# Patient Record
Sex: FEMALE | Race: WHITE | NOT HISPANIC OR LATINO | Employment: OTHER | URBAN - METROPOLITAN AREA
[De-identification: names, ages, dates, MRNs, and addresses within clinical notes are randomized per-mention and may not be internally consistent; named-entity substitution may affect disease eponyms.]

---

## 2023-04-17 DIAGNOSIS — F41.8 DEPRESSION WITH ANXIETY: Primary | ICD-10-CM

## 2023-04-17 RX ORDER — SERTRALINE HYDROCHLORIDE 50 MG/1
50 TABLET, FILM COATED ORAL DAILY
Qty: 90 TABLET | Refills: 0 | Status: SHIPPED | OUTPATIENT
Start: 2023-04-17 | End: 2023-07-20 | Stop reason: SDUPTHER

## 2023-04-17 RX ORDER — SERTRALINE HYDROCHLORIDE 50 MG/1
1 TABLET, FILM COATED ORAL DAILY
COMMUNITY
Start: 2013-10-25 | End: 2023-04-17 | Stop reason: SDUPTHER

## 2023-07-17 DIAGNOSIS — F41.8 DEPRESSION WITH ANXIETY: ICD-10-CM

## 2023-07-17 RX ORDER — SERTRALINE HYDROCHLORIDE 50 MG/1
TABLET, FILM COATED ORAL
Qty: 90 TABLET | Refills: 3 | OUTPATIENT
Start: 2023-07-17

## 2023-07-20 DIAGNOSIS — F41.8 DEPRESSION WITH ANXIETY: ICD-10-CM

## 2023-07-20 RX ORDER — SERTRALINE HYDROCHLORIDE 50 MG/1
50 TABLET, FILM COATED ORAL DAILY
Qty: 90 TABLET | Refills: 0 | Status: SHIPPED | OUTPATIENT
Start: 2023-07-20 | End: 2023-10-17 | Stop reason: SDUPTHER

## 2023-08-04 ENCOUNTER — APPOINTMENT (OUTPATIENT)
Dept: PRIMARY CARE | Facility: CLINIC | Age: 83
End: 2023-08-04
Payer: MEDICARE

## 2023-08-08 ENCOUNTER — OFFICE VISIT (OUTPATIENT)
Dept: PRIMARY CARE | Facility: CLINIC | Age: 83
End: 2023-08-08
Payer: MEDICARE

## 2023-08-08 VITALS
HEART RATE: 65 BPM | BODY MASS INDEX: 25.69 KG/M2 | DIASTOLIC BLOOD PRESSURE: 84 MMHG | HEIGHT: 63 IN | SYSTOLIC BLOOD PRESSURE: 132 MMHG | WEIGHT: 145 LBS

## 2023-08-08 DIAGNOSIS — Z51.81 ENCOUNTER FOR THERAPEUTIC DRUG LEVEL MONITORING: ICD-10-CM

## 2023-08-08 DIAGNOSIS — I48.20 CHRONIC ATRIAL FIBRILLATION (MULTI): ICD-10-CM

## 2023-08-08 DIAGNOSIS — F51.01 PRIMARY INSOMNIA: ICD-10-CM

## 2023-08-08 DIAGNOSIS — I10 PRIMARY HYPERTENSION: Primary | ICD-10-CM

## 2023-08-08 DIAGNOSIS — E78.2 MIXED HYPERLIPIDEMIA: ICD-10-CM

## 2023-08-08 DIAGNOSIS — D05.11 DUCTAL CARCINOMA IN SITU (DCIS) OF RIGHT BREAST: ICD-10-CM

## 2023-08-08 PROBLEM — G47.00 INSOMNIA: Status: ACTIVE | Noted: 2023-08-08

## 2023-08-08 PROBLEM — I50.810 RIGHT HEART FAILURE (MULTI): Status: ACTIVE | Noted: 2023-08-08

## 2023-08-08 PROBLEM — G56.21 CUBITAL TUNNEL SYNDROME, RIGHT: Status: ACTIVE | Noted: 2023-08-08

## 2023-08-08 PROBLEM — R73.9 HYPERGLYCEMIA: Status: ACTIVE | Noted: 2023-08-08

## 2023-08-08 PROBLEM — G57.50 TARSAL TUNNEL SYNDROME: Status: ACTIVE | Noted: 2023-08-08

## 2023-08-08 PROBLEM — I07.1 SEVERE TRICUSPID REGURGITATION: Status: ACTIVE | Noted: 2023-08-08

## 2023-08-08 PROBLEM — N64.89 SEROMA OF BREAST: Status: ACTIVE | Noted: 2023-08-08

## 2023-08-08 PROBLEM — R92.8 ABNORMAL MAMMOGRAM: Status: ACTIVE | Noted: 2023-08-08

## 2023-08-08 PROBLEM — G25.0 BENIGN FAMILIAL TREMOR: Status: ACTIVE | Noted: 2023-08-08

## 2023-08-08 PROBLEM — R92.1 BREAST CALCIFICATION, RIGHT: Status: ACTIVE | Noted: 2023-08-08

## 2023-08-08 PROBLEM — S46.911A RIGHT SHOULDER STRAIN: Status: ACTIVE | Noted: 2023-08-08

## 2023-08-08 PROBLEM — N64.59 BLEEDING FROM BREAST: Status: ACTIVE | Noted: 2023-08-08

## 2023-08-08 PROBLEM — R00.1 SINUS BRADYCARDIA: Status: ACTIVE | Noted: 2023-08-08

## 2023-08-08 PROBLEM — Z98.890 S/P LUMPECTOMY, RIGHT BREAST: Status: ACTIVE | Noted: 2023-08-08

## 2023-08-08 PROBLEM — I42.5 RESTRICTIVE CARDIOMYOPATHY (MULTI): Status: ACTIVE | Noted: 2023-08-08

## 2023-08-08 PROBLEM — M79.641 HAND PAIN, RIGHT: Status: ACTIVE | Noted: 2023-08-08

## 2023-08-08 PROBLEM — E78.5 HYPERLIPIDEMIA: Status: ACTIVE | Noted: 2023-08-08

## 2023-08-08 PROBLEM — E55.9 VITAMIN D DEFICIENCY: Status: ACTIVE | Noted: 2023-08-08

## 2023-08-08 PROBLEM — I48.91 ATRIAL FIBRILLATION (MULTI): Status: ACTIVE | Noted: 2023-08-08

## 2023-08-08 PROBLEM — H35.30 MACULAR DEGENERATION: Status: ACTIVE | Noted: 2023-08-08

## 2023-08-08 PROBLEM — Z95.4 TRICUSPID VALVE REPLACED: Status: ACTIVE | Noted: 2023-08-08

## 2023-08-08 LAB
ALANINE AMINOTRANSFERASE (SGPT) (U/L) IN SER/PLAS: 18 U/L (ref 7–45)
ALBUMIN (G/DL) IN SER/PLAS: 4.7 G/DL (ref 3.4–5)
ALKALINE PHOSPHATASE (U/L) IN SER/PLAS: 59 U/L (ref 33–136)
ANION GAP IN SER/PLAS: 15 MMOL/L (ref 10–20)
ASPARTATE AMINOTRANSFERASE (SGOT) (U/L) IN SER/PLAS: 22 U/L (ref 9–39)
BASOPHILS (10*3/UL) IN BLOOD BY AUTOMATED COUNT: 0.05 X10E9/L (ref 0–0.1)
BASOPHILS/100 LEUKOCYTES IN BLOOD BY AUTOMATED COUNT: 1.2 % (ref 0–2)
BILIRUBIN TOTAL (MG/DL) IN SER/PLAS: 0.7 MG/DL (ref 0–1.2)
CALCIUM (MG/DL) IN SER/PLAS: 9.7 MG/DL (ref 8.6–10.6)
CARBON DIOXIDE, TOTAL (MMOL/L) IN SER/PLAS: 30 MMOL/L (ref 21–32)
CHLORIDE (MMOL/L) IN SER/PLAS: 100 MMOL/L (ref 98–107)
CHOLESTEROL (MG/DL) IN SER/PLAS: 189 MG/DL (ref 0–199)
CHOLESTEROL IN HDL (MG/DL) IN SER/PLAS: 96.9 MG/DL
CHOLESTEROL/HDL RATIO: 2
CREATININE (MG/DL) IN SER/PLAS: 0.99 MG/DL (ref 0.5–1.05)
EOSINOPHILS (10*3/UL) IN BLOOD BY AUTOMATED COUNT: 0.08 X10E9/L (ref 0–0.4)
EOSINOPHILS/100 LEUKOCYTES IN BLOOD BY AUTOMATED COUNT: 1.9 % (ref 0–6)
ERYTHROCYTE DISTRIBUTION WIDTH (RATIO) BY AUTOMATED COUNT: 13.5 % (ref 11.5–14.5)
ERYTHROCYTE MEAN CORPUSCULAR HEMOGLOBIN CONCENTRATION (G/DL) BY AUTOMATED: 31.4 G/DL (ref 32–36)
ERYTHROCYTE MEAN CORPUSCULAR VOLUME (FL) BY AUTOMATED COUNT: 96 FL (ref 80–100)
ERYTHROCYTES (10*6/UL) IN BLOOD BY AUTOMATED COUNT: 4.23 X10E12/L (ref 4–5.2)
GFR FEMALE: 57 ML/MIN/1.73M2
GLUCOSE (MG/DL) IN SER/PLAS: 90 MG/DL (ref 74–99)
HEMATOCRIT (%) IN BLOOD BY AUTOMATED COUNT: 40.4 % (ref 36–46)
HEMOGLOBIN (G/DL) IN BLOOD: 12.7 G/DL (ref 12–16)
IMMATURE GRANULOCYTES/100 LEUKOCYTES IN BLOOD BY AUTOMATED COUNT: 0.2 % (ref 0–0.9)
LDL: 77 MG/DL (ref 0–99)
LEUKOCYTES (10*3/UL) IN BLOOD BY AUTOMATED COUNT: 4.2 X10E9/L (ref 4.4–11.3)
LYMPHOCYTES (10*3/UL) IN BLOOD BY AUTOMATED COUNT: 1.43 X10E9/L (ref 0.8–3)
LYMPHOCYTES/100 LEUKOCYTES IN BLOOD BY AUTOMATED COUNT: 33.9 % (ref 13–44)
MONOCYTES (10*3/UL) IN BLOOD BY AUTOMATED COUNT: 0.41 X10E9/L (ref 0.05–0.8)
MONOCYTES/100 LEUKOCYTES IN BLOOD BY AUTOMATED COUNT: 9.7 % (ref 2–10)
NEUTROPHILS (10*3/UL) IN BLOOD BY AUTOMATED COUNT: 2.24 X10E9/L (ref 1.6–5.5)
NEUTROPHILS/100 LEUKOCYTES IN BLOOD BY AUTOMATED COUNT: 53.1 % (ref 40–80)
NRBC (PER 100 WBCS) BY AUTOMATED COUNT: 0 /100 WBC (ref 0–0)
PLATELETS (10*3/UL) IN BLOOD AUTOMATED COUNT: 211 X10E9/L (ref 150–450)
POTASSIUM (MMOL/L) IN SER/PLAS: 4.6 MMOL/L (ref 3.5–5.3)
PROTEIN TOTAL: 7.5 G/DL (ref 6.4–8.2)
SODIUM (MMOL/L) IN SER/PLAS: 140 MMOL/L (ref 136–145)
TRIGLYCERIDE (MG/DL) IN SER/PLAS: 75 MG/DL (ref 0–149)
UREA NITROGEN (MG/DL) IN SER/PLAS: 14 MG/DL (ref 6–23)
VLDL: 15 MG/DL (ref 0–40)

## 2023-08-08 PROCEDURE — 80053 COMPREHEN METABOLIC PANEL: CPT

## 2023-08-08 PROCEDURE — 1159F MED LIST DOCD IN RCRD: CPT | Performed by: FAMILY MEDICINE

## 2023-08-08 PROCEDURE — G0439 PPPS, SUBSEQ VISIT: HCPCS | Performed by: FAMILY MEDICINE

## 2023-08-08 PROCEDURE — 99214 OFFICE O/P EST MOD 30 MIN: CPT | Performed by: FAMILY MEDICINE

## 2023-08-08 PROCEDURE — 85025 COMPLETE CBC W/AUTO DIFF WBC: CPT

## 2023-08-08 PROCEDURE — 1126F AMNT PAIN NOTED NONE PRSNT: CPT | Performed by: FAMILY MEDICINE

## 2023-08-08 PROCEDURE — 1036F TOBACCO NON-USER: CPT | Performed by: FAMILY MEDICINE

## 2023-08-08 PROCEDURE — 80061 LIPID PANEL: CPT

## 2023-08-08 PROCEDURE — 1170F FXNL STATUS ASSESSED: CPT | Performed by: FAMILY MEDICINE

## 2023-08-08 PROCEDURE — 80307 DRUG TEST PRSMV CHEM ANLYZR: CPT

## 2023-08-08 PROCEDURE — 1160F RVW MEDS BY RX/DR IN RCRD: CPT | Performed by: FAMILY MEDICINE

## 2023-08-08 PROCEDURE — 3075F SYST BP GE 130 - 139MM HG: CPT | Performed by: FAMILY MEDICINE

## 2023-08-08 PROCEDURE — 3079F DIAST BP 80-89 MM HG: CPT | Performed by: FAMILY MEDICINE

## 2023-08-08 RX ORDER — AMLODIPINE BESYLATE 5 MG/1
TABLET ORAL
COMMUNITY
End: 2023-11-10

## 2023-08-08 RX ORDER — ATORVASTATIN CALCIUM 20 MG/1
TABLET, FILM COATED ORAL
COMMUNITY
End: 2024-05-09 | Stop reason: SDUPTHER

## 2023-08-08 RX ORDER — ACETAMINOPHEN 500 MG
TABLET ORAL
COMMUNITY

## 2023-08-08 RX ORDER — MULTIVITAMIN
TABLET ORAL
COMMUNITY
End: 2024-05-14 | Stop reason: ALTCHOICE

## 2023-08-08 RX ORDER — POTASSIUM CHLORIDE 750 MG/1
TABLET, FILM COATED, EXTENDED RELEASE ORAL
COMMUNITY

## 2023-08-08 RX ORDER — ZOLPIDEM TARTRATE 10 MG/1
TABLET ORAL
COMMUNITY
End: 2024-05-14 | Stop reason: ALTCHOICE

## 2023-08-08 RX ORDER — FUROSEMIDE 20 MG/1
TABLET ORAL
COMMUNITY

## 2023-08-08 ASSESSMENT — ENCOUNTER SYMPTOMS
MUSCULOSKELETAL NEGATIVE: 1
CONSTITUTIONAL NEGATIVE: 1
CARDIOVASCULAR NEGATIVE: 1
RESPIRATORY NEGATIVE: 1
LOSS OF SENSATION IN FEET: 0
OCCASIONAL FEELINGS OF UNSTEADINESS: 0
DEPRESSION: 0
GASTROINTESTINAL NEGATIVE: 1
TREMORS: 1

## 2023-08-08 ASSESSMENT — ACTIVITIES OF DAILY LIVING (ADL)
BATHING: INDEPENDENT
DOING_HOUSEWORK: INDEPENDENT
MANAGING_FINANCES: INDEPENDENT
DRESSING: INDEPENDENT
TAKING_MEDICATION: INDEPENDENT
GROCERY_SHOPPING: INDEPENDENT

## 2023-08-08 ASSESSMENT — PATIENT HEALTH QUESTIONNAIRE - PHQ9
2. FEELING DOWN, DEPRESSED OR HOPELESS: NOT AT ALL
SUM OF ALL RESPONSES TO PHQ9 QUESTIONS 1 AND 2: 0
1. LITTLE INTEREST OR PLEASURE IN DOING THINGS: NOT AT ALL

## 2023-08-08 NOTE — PROGRESS NOTES
Subjective   Patient ID: Sirisha Whyte is a 82 y.o. female who presents for Medicare Annual Wellness Visit Subsequent.    HPI htn, chol, depression well cont , insomnia well cont on zolpidem, breaset ca hx stable , afibb rate contOARRS:  No data recorded  I have personally reviewed the OARRS report for Sirisha Whyte. I have considered the risks of abuse, dependence, addiction and diversion    Is the patient prescribed a combination of a benzodiazepine and opioid?  No    Last Urine Drug Screen / ordered today: Yes  Recent Results (from the past 96017 hour(s))   Drug Screen, Urine With Reflex to Confirmation    Collection Time: 04/21/22 11:21 AM   Result Value Ref Range    DRUG SCREEN COMMENT URINE SEE BELOW     Amphetamine Screen, Urine PRESUMPTIVE NEGATIVE NEGATIVE    Barbiturate Screen, Urine PRESUMPTIVE NEGATIVE NEGATIVE    BENZODIAZEPINE (PRESENCE) IN URINE BY SCREEN METHOD PRESUMPTIVE NEGATIVE NEGATIVE    Cannabinoid Screen, Urine PRESUMPTIVE NEGATIVE NEGATIVE    Cocaine Screen, Urine PRESUMPTIVE NEGATIVE NEGATIVE    Fentanyl, Ur PRESUMPTIVE NEGATIVE NEGATIVE    Methadone Screen, Urine PRESUMPTIVE NEGATIVE NEGATIVE    Opiate Screen, Urine PRESUMPTIVE NEGATIVE NEGATIVE    Oxycodone Screen, Ur PRESUMPTIVE NEGATIVE NEGATIVE    PCP Screen, Urine PRESUMPTIVE NEGATIVE NEGATIVE     N/A    Controlled Substance Agreement:  Date of the Last Agreement: today  Reviewed Controlled Substance Agreement including but not limited to the benefits, risks, and alternatives to treatment with a Controlled Substance medication(s).    Sleep Aids:   What is the patient's goal of therapy? Sleep aid  Is this being achieved with current treatment? yes    Activities of Daily Living:   Is your overall impression that this patient is benefiting (symptom reduction outweighs side effects) from sleep aid therapy? Yes     1. Physical Functioning: Better  2. Family Relationship: Better  3. Social Relationship: Better  4. Mood: Better  5.  "Sleep Patterns: Better  6. Overall Function: Better      Review of Systems   Constitutional: Negative.    HENT: Negative.     Respiratory: Negative.     Cardiovascular: Negative.    Gastrointestinal: Negative.    Musculoskeletal: Negative.         Carpal tunnel flare bilat    Neurological:  Positive for tremors.       Objective   /84   Pulse 65   Ht 1.6 m (5' 3\")   Wt 65.8 kg (145 lb)   BMI 25.69 kg/m²     Physical Exam  Vitals reviewed.   Constitutional:       Appearance: Normal appearance. She is normal weight.   Eyes:      Extraocular Movements: Extraocular movements intact.      Conjunctiva/sclera: Conjunctivae normal.      Pupils: Pupils are equal, round, and reactive to light.   Cardiovascular:      Rate and Rhythm: Normal rate and regular rhythm.      Pulses: Normal pulses.      Heart sounds: Normal heart sounds.   Pulmonary:      Effort: Pulmonary effort is normal.      Breath sounds: Normal breath sounds.   Abdominal:      General: Bowel sounds are normal.      Palpations: Abdomen is soft.   Musculoskeletal:         General: Normal range of motion.   Skin:     General: Skin is warm and dry.   Neurological:      General: No focal deficit present.      Mental Status: She is alert and oriented to person, place, and time. Mental status is at baseline.      Comments: Left sided intention tremor , no cogwheel rigidity         Assessment/Plan   Problem List Items Addressed This Visit       Atrial fibrillation (CMS/HCC)    Relevant Medications    amLODIPine (Norvasc) 5 mg tablet    Other Relevant Orders    CBC and Auto Differential    Ductal carcinoma in situ (DCIS) of right breast    Hyperlipidemia    Relevant Orders    Lipid Panel    Hypertension - Primary    Relevant Orders    Comprehensive Metabolic Panel    Insomnia    Relevant Orders    Drug Screen, Urine With Reflex to Confirmation     Other Visit Diagnoses       Encounter for therapeutic drug level monitoring        Relevant Orders    Drug Screen, " Urine With Reflex to Confirmation        Bilat carpal tunnel - wrist splints at bedtime for 2 wk

## 2023-08-08 NOTE — PROGRESS NOTES
Subjective   Reason for Visit: Sirisha Whyte is an 82 y.o. female here for a Medicare Wellness visit.      Pt comes in for wellness exam. Pt states she gets some tingling in hands and fingers sometimes.          HPI    Patient Care Team:  Chris Henderson MD as PCP - General (Family Medicine)     Review of Systems    Objective   Vitals:  There were no vitals taken for this visit.      Physical Exam    Assessment/Plan   Problem List Items Addressed This Visit    None

## 2023-08-09 LAB
AMPHETAMINE (PRESENCE) IN URINE BY SCREEN METHOD: NORMAL
BARBITURATES PRESENCE IN URINE BY SCREEN METHOD: NORMAL
BENZODIAZEPINE (PRESENCE) IN URINE BY SCREEN METHOD: NORMAL
CANNABINOIDS IN URINE BY SCREEN METHOD: NORMAL
COCAINE (PRESENCE) IN URINE BY SCREEN METHOD: NORMAL
DRUG SCREEN COMMENT URINE: NORMAL
FENTANYL URINE: NORMAL
METHADONE (PRESENCE) IN URINE BY SCREEN METHOD: NORMAL
OPIATES (PRESENCE) IN URINE BY SCREEN METHOD: NORMAL
OXYCODONE (PRESENCE) IN URINE BY SCREEN METHOD: NORMAL
PHENCYCLIDINE (PRESENCE) IN URINE BY SCREEN METHOD: NORMAL

## 2023-08-10 ENCOUNTER — TELEPHONE (OUTPATIENT)
Dept: PRIMARY CARE | Facility: CLINIC | Age: 83
End: 2023-08-10
Payer: MEDICARE

## 2023-08-24 ENCOUNTER — TELEPHONE (OUTPATIENT)
Dept: PRIMARY CARE | Facility: CLINIC | Age: 83
End: 2023-08-24
Payer: MEDICARE

## 2023-09-22 PROBLEM — J30.9 ALLERGIC RHINITIS: Status: ACTIVE | Noted: 2023-09-22

## 2023-09-22 PROBLEM — I36.1 NON-RHEUMATIC TRICUSPID VALVE INSUFFICIENCY: Status: ACTIVE | Noted: 2019-07-31

## 2023-09-22 PROBLEM — K59.09 CHRONIC CONSTIPATION: Status: ACTIVE | Noted: 2023-09-22

## 2023-09-22 PROBLEM — Z92.3 S/P RADIATION THERAPY: Status: ACTIVE | Noted: 2023-09-22

## 2023-09-22 PROBLEM — L50.0 ALLERGIC URTICARIA: Status: ACTIVE | Noted: 2023-09-22

## 2023-09-22 PROBLEM — I51.89: Status: ACTIVE | Noted: 2019-08-07

## 2023-09-22 PROBLEM — I25.810 CORONARY ARTERY DISEASE INVOLVING AUTOLOGOUS ARTERY CORONARY BYPASS GRAFT WITHOUT ANGINA PECTORIS: Status: ACTIVE | Noted: 2019-07-31

## 2023-09-22 RX ORDER — ACETAMINOPHEN 325 MG/1
650 TABLET ORAL EVERY 4 HOURS PRN
COMMUNITY
Start: 2019-08-11

## 2023-09-22 RX ORDER — FUROSEMIDE 20 MG/1
20 TABLET ORAL
COMMUNITY
Start: 2019-08-12 | End: 2024-05-14 | Stop reason: ALTCHOICE

## 2023-09-22 RX ORDER — WARFARIN 4 MG/1
4 TABLET ORAL
COMMUNITY
Start: 2019-08-11 | End: 2024-05-14 | Stop reason: ALTCHOICE

## 2023-09-22 RX ORDER — SPIRONOLACTONE 25 MG/1
25 TABLET ORAL
COMMUNITY
Start: 2019-08-12 | End: 2024-05-14 | Stop reason: ALTCHOICE

## 2023-09-22 RX ORDER — METOPROLOL SUCCINATE 50 MG/1
50 TABLET, EXTENDED RELEASE ORAL 2 TIMES DAILY
COMMUNITY
Start: 2019-08-11 | End: 2024-05-14 | Stop reason: ALTCHOICE

## 2023-09-22 RX ORDER — POLYETHYLENE GLYCOL 3350 17 G/17G
17 POWDER, FOR SOLUTION ORAL EVERY 12 HOURS PRN
COMMUNITY
Start: 2019-08-11

## 2023-09-22 RX ORDER — AMLODIPINE BESYLATE 2.5 MG/1
2.5 TABLET ORAL
COMMUNITY
Start: 2017-09-27 | End: 2024-05-14 | Stop reason: ALTCHOICE

## 2023-09-22 RX ORDER — NAPROXEN SODIUM 220 MG/1
81 TABLET, FILM COATED ORAL
COMMUNITY
Start: 2019-08-12 | End: 2024-05-14 | Stop reason: ALTCHOICE

## 2023-09-22 RX ORDER — ZOLPIDEM TARTRATE 5 MG/1
5 TABLET ORAL NIGHTLY
COMMUNITY
Start: 2019-08-11

## 2023-09-22 RX ORDER — VIT C/E/ZN/COPPR/LUTEIN/ZEAXAN 250MG-90MG
1000 CAPSULE ORAL
COMMUNITY
Start: 2011-10-20 | End: 2024-05-14 | Stop reason: ALTCHOICE

## 2023-10-16 DIAGNOSIS — F41.8 DEPRESSION WITH ANXIETY: ICD-10-CM

## 2023-10-16 RX ORDER — SERTRALINE HYDROCHLORIDE 50 MG/1
50 TABLET, FILM COATED ORAL DAILY
Qty: 90 TABLET | Refills: 3 | OUTPATIENT
Start: 2023-10-16

## 2023-10-18 RX ORDER — SERTRALINE HYDROCHLORIDE 50 MG/1
50 TABLET, FILM COATED ORAL DAILY
Qty: 90 TABLET | Refills: 0 | Status: SHIPPED | OUTPATIENT
Start: 2023-10-18 | End: 2024-01-16 | Stop reason: SDUPTHER

## 2023-10-23 ENCOUNTER — OFFICE VISIT (OUTPATIENT)
Dept: CARDIOLOGY | Facility: CLINIC | Age: 83
End: 2023-10-23
Payer: MEDICARE

## 2023-10-23 VITALS
SYSTOLIC BLOOD PRESSURE: 133 MMHG | DIASTOLIC BLOOD PRESSURE: 84 MMHG | HEIGHT: 62 IN | BODY MASS INDEX: 27.51 KG/M2 | HEART RATE: 79 BPM | WEIGHT: 149.5 LBS | OXYGEN SATURATION: 97 %

## 2023-10-23 DIAGNOSIS — I10 PRIMARY HYPERTENSION: Primary | ICD-10-CM

## 2023-10-23 PROCEDURE — 1160F RVW MEDS BY RX/DR IN RCRD: CPT | Performed by: INTERNAL MEDICINE

## 2023-10-23 PROCEDURE — 93010 ELECTROCARDIOGRAM REPORT: CPT | Performed by: INTERNAL MEDICINE

## 2023-10-23 PROCEDURE — 99214 OFFICE O/P EST MOD 30 MIN: CPT | Mod: PO,25 | Performed by: INTERNAL MEDICINE

## 2023-10-23 PROCEDURE — 3079F DIAST BP 80-89 MM HG: CPT | Performed by: INTERNAL MEDICINE

## 2023-10-23 PROCEDURE — 99214 OFFICE O/P EST MOD 30 MIN: CPT | Performed by: INTERNAL MEDICINE

## 2023-10-23 PROCEDURE — 3075F SYST BP GE 130 - 139MM HG: CPT | Performed by: INTERNAL MEDICINE

## 2023-10-23 PROCEDURE — 93005 ELECTROCARDIOGRAM TRACING: CPT | Mod: PO | Performed by: INTERNAL MEDICINE

## 2023-10-23 PROCEDURE — 1126F AMNT PAIN NOTED NONE PRSNT: CPT | Performed by: INTERNAL MEDICINE

## 2023-10-23 PROCEDURE — 1036F TOBACCO NON-USER: CPT | Performed by: INTERNAL MEDICINE

## 2023-10-23 PROCEDURE — 1159F MED LIST DOCD IN RCRD: CPT | Performed by: INTERNAL MEDICINE

## 2023-10-23 ASSESSMENT — PATIENT HEALTH QUESTIONNAIRE - PHQ9
SUM OF ALL RESPONSES TO PHQ9 QUESTIONS 1 AND 2: 0
1. LITTLE INTEREST OR PLEASURE IN DOING THINGS: NOT AT ALL
2. FEELING DOWN, DEPRESSED OR HOPELESS: NOT AT ALL

## 2023-10-23 ASSESSMENT — COLUMBIA-SUICIDE SEVERITY RATING SCALE - C-SSRS
1. IN THE PAST MONTH, HAVE YOU WISHED YOU WERE DEAD OR WISHED YOU COULD GO TO SLEEP AND NOT WAKE UP?: NO
2. HAVE YOU ACTUALLY HAD ANY THOUGHTS OF KILLING YOURSELF?: NO
6. HAVE YOU EVER DONE ANYTHING, STARTED TO DO ANYTHING, OR PREPARED TO DO ANYTHING TO END YOUR LIFE?: NO

## 2023-10-23 ASSESSMENT — ENCOUNTER SYMPTOMS
OCCASIONAL FEELINGS OF UNSTEADINESS: 0
LOSS OF SENSATION IN FEET: 0
DEPRESSION: 0

## 2023-10-23 ASSESSMENT — PAIN SCALES - GENERAL: PAINLEVEL: 0-NO PAIN

## 2023-10-23 NOTE — PROGRESS NOTES
Primary Care Physician: Chris Henderson MD  Date of Visit: 10/23/2023 10:40 AM EDT  [place] [facility] [place] [address]     Chief Complaint:        HPI / Summary:   Sirisha Whyte is a 83 y.o. female presents for followup. 83-year-old female being seen in cardiovascular office follow-up.  She has a history of tricuspid valve repair and bypass in 2019 at [facility].  She recently returned from travel abroad visited [facility] [place] and seem to travel widely with relatively little limitation.  She notes some fatigue but aside from that no signs of hypervolemia per se.  She had an echo in 2021 showing a minimally regurgitant tricuspid valve.  Denies chest pain orthopnea PND.  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 She has never used smokeless tobacco. She reports current alcohol use of about 3.0 standard drinks of alcohol per week. She reports that she does not use drugs.      Allergies:  Allergies   Allergen Reactions    Ace Inhibitors Angioedema and Swelling       Outpatient Medications:  Current Outpatient Medications   Medication Instructions    acetaminophen (TYLENOL) 650 mg, oral, Every 4 hours PRN    amLODIPine (Norvasc) 5 mg tablet Take 1 tablet daily    amLODIPine (NORVASC) 2.5 mg, oral, Daily RT    apixaban (Eliquis) 5 mg tablet Take 1 tablet twice a day    aspirin 81 mg, oral, Daily RT    atorvastatin (Lipitor) 20 mg tablet Take 1  "tablet daily    cholecalciferol (Vitamin D-3) 50 mcg (2,000 unit) capsule TAKE 1 CAPSULE Daily    cholecalciferol (VITAMIN D-3) 1,000 Units, oral, Daily RT    furosemide (Lasix) 20 mg tablet TAKE 1 TABLET DAILY AS NEEDED FOR WEIGHT GAIN MORE THAN 2 TO 3 POUNDS IN TWO DAYS    furosemide (LASIX) 20 mg, oral, Daily RT    metoprolol succinate XL (TOPROL-XL) 50 mg, oral, 2 times daily    multivit with minerals/lutein (VISION PLUS LUTEIN ORAL) 1 capsule, oral, Daily    multivitamin tablet TAKE 1 TABLET DAILY.    polyethylene glycol (GLYCOLAX, MIRALAX) 17 g, oral, Every 12 hours PRN    potassium chloride CR 10 mEq ER tablet Take 1 tablet daily    sertraline (ZOLOFT) 50 mg, oral, Daily    spironolactone (ALDACTONE) 25 mg, oral, Daily RT    warfarin (COUMADIN) 4 mg, oral, Daily RT    wheat dextrin (BENEFIBER HEALTHY SHAPE ORAL) oral, Benefiber oral powder ---use as directed    zolpidem (Ambien) 10 mg tablet TAKE 1 TABLET Bedtime PRN    zolpidem (AMBIEN) 5 mg, oral, Nightly       ROS     Physical Exam:  Vitals:    10/23/23 1030   BP: 133/84   BP Location: Left arm   Patient Position: Sitting   BP Cuff Size: Adult   Pulse: 79   SpO2: 97%   Weight: 67.8 kg (149 lb 8 oz)   Height: 1.575 m (5' 2\")     Wt Readings from Last 5 Encounters:   10/23/23 67.8 kg (149 lb 8 oz)   08/08/23 65.8 kg (145 lb)   05/08/23 68.6 kg (151 lb 4 oz)   04/24/23 66.5 kg (146 lb 9 oz)   11/21/22 67.6 kg (149 lb)     Body mass index is 27.34 kg/m².   Well-developed well-nourished female in no acute distress.  Flat JVP.  Irregular rhythm.  No gallop no murmur.  Clear lungs.  Soft abdomen.  No lower extremity edema     Last Labs:  CMP:  Recent Labs     08/08/23  1320 11/21/22  1055 04/21/22  1110 05/06/21  1141 10/13/20  1305    137 139 140 138   K 4.6 4.7 4.1 4.2 4.4    102 100 100 100   CO2 30 27 30 31 30   ANIONGAP 15 13 13 13 12   BUN 14 21 15 14 15   CREATININE 0.99 1.02 0.88 0.85 1.08*   GLUCOSE 90 83 92 95 90     Recent Labs     " "08/08/23  1320 11/21/22  1055 04/21/22  1110 10/13/20  1305 01/18/19  0917   ALBUMIN 4.7 4.5 4.6 4.4 4.7   ALKPHOS 59 59 75 66 66   ALT 18 22 21 24 20   AST 22 23 24 29 23   BILITOT 0.7 0.7 0.7 0.7 0.6     CBC:  Recent Labs     08/08/23  1320 11/21/22  1055 04/21/22  1110 05/06/21  1141 10/13/20  1305   WBC 4.2* 3.6* 4.0* 4.6 4.5   HGB 12.7 12.4 13.2 13.1 12.4   HCT 40.4 39.3 40.4 40.7 39.3    185 219 254 211   MCV 96 95 94 95 96     COAG:   Recent Labs     08/22/19  1019 06/26/18  1506   INR 1.5* 1.3*     HEME/ENDO:  Recent Labs     11/21/22  1055 10/13/20  1305   TSH 1.49 1.92      CARDIAC: No results for input(s): \"LDH\", \"CKMB\", \"TROPHS\", \"BNP\" in the last 95789 hours.    No lab exists for component: \"CK\", \"CKMBP\"  Recent Labs     08/08/23  1320 11/21/22  1055 04/21/22  1110   CHOL 189 186 192   LDLF 77 80 76   HDL 96.9 93.1 106.5   TRIG 75 63 50       Last Cardiology Tests:  ECG:      Echo:  Echo Results:  No results found for this or any previous visit from the past 3650 days.       Cath:      Stress Test:  Stress Results:  No results found for this or any previous visit from the past 365 days.         Cardiac Imaging:        Assessment/Plan     83-year-old female being seen in cardiovascular office follow-up.  She has a history of tricuspid valve repair and bypass in 2019 at [facility].  S she does not demonstrate any signs of hypervolemia.  She continues to do well clinically I tried to order some reassurance.  No further diagnostic imaging is advised.  Reviewed signs and symptoms of heart failure.  Follow-up 6 months thank you for allowing me to participate in her care followup Appts:  Future Appointments   Date Time Provider Department Center   4/23/2024 10:00 AM Fuentes Pedraza MD [id number] [facility]   5/13/2024  8:45 AM [address]   5/13/2024  9:30 AM EDER Bright-CNP [id number] [facility]           ____________________________________________________________  Fuentes Pedraza, " MD    Senior Attending Physician  [facility]  [facility]    [facility]  [facility] [employer]

## 2023-10-30 LAB
ATRIAL RATE: 300 BPM
Q ONSET: 218 MS
QRS COUNT: 13 BEATS
QRS DURATION: 80 MS
QT INTERVAL: 396 MS
QTC CALCULATION(BAZETT): 454 MS
QTC FREDERICIA: 434 MS
R AXIS: 44 DEGREES
T AXIS: -31 DEGREES
T OFFSET: 416 MS
VENTRICULAR RATE: 79 BPM

## 2023-11-10 DIAGNOSIS — I10 PRIMARY HYPERTENSION: Primary | ICD-10-CM

## 2023-11-10 RX ORDER — AMLODIPINE BESYLATE 5 MG/1
TABLET ORAL
Qty: 90 TABLET | Refills: 3 | Status: SHIPPED | OUTPATIENT
Start: 2023-11-10

## 2023-11-10 RX ORDER — APIXABAN 5 MG/1
5 TABLET, FILM COATED ORAL 2 TIMES DAILY
Qty: 180 TABLET | Refills: 3 | Status: SHIPPED | OUTPATIENT
Start: 2023-11-10 | End: 2023-11-15 | Stop reason: SDUPTHER

## 2023-11-15 ENCOUNTER — TELEPHONE (OUTPATIENT)
Dept: CARDIOLOGY | Facility: HOSPITAL | Age: 83
End: 2023-11-15
Payer: MEDICARE

## 2023-11-15 DIAGNOSIS — I10 PRIMARY HYPERTENSION: ICD-10-CM

## 2024-01-16 DIAGNOSIS — F41.8 DEPRESSION WITH ANXIETY: ICD-10-CM

## 2024-01-17 RX ORDER — SERTRALINE HYDROCHLORIDE 50 MG/1
50 TABLET, FILM COATED ORAL DAILY
Qty: 90 TABLET | Refills: 1 | Status: SHIPPED | OUTPATIENT
Start: 2024-01-17 | End: 2024-01-20

## 2024-01-19 DIAGNOSIS — F41.8 DEPRESSION WITH ANXIETY: ICD-10-CM

## 2024-01-20 RX ORDER — SERTRALINE HYDROCHLORIDE 50 MG/1
50 TABLET, FILM COATED ORAL DAILY
Qty: 90 TABLET | Refills: 1 | Status: SHIPPED | OUTPATIENT
Start: 2024-01-20 | End: 2024-05-13 | Stop reason: SDUPTHER

## 2024-04-23 ENCOUNTER — APPOINTMENT (OUTPATIENT)
Dept: CARDIOLOGY | Facility: CLINIC | Age: 84
End: 2024-04-23
Payer: MEDICARE

## 2024-04-30 ENCOUNTER — APPOINTMENT (OUTPATIENT)
Dept: CARDIOLOGY | Facility: HOSPITAL | Age: 84
End: 2024-04-30
Payer: MEDICARE

## 2024-04-30 ENCOUNTER — APPOINTMENT (OUTPATIENT)
Dept: RADIOLOGY | Facility: HOSPITAL | Age: 84
End: 2024-04-30
Payer: MEDICARE

## 2024-04-30 ENCOUNTER — HOSPITAL ENCOUNTER (EMERGENCY)
Facility: HOSPITAL | Age: 84
Discharge: HOME | End: 2024-04-30
Attending: EMERGENCY MEDICINE
Payer: MEDICARE

## 2024-04-30 VITALS
DIASTOLIC BLOOD PRESSURE: 87 MMHG | HEIGHT: 63 IN | BODY MASS INDEX: 26.4 KG/M2 | SYSTOLIC BLOOD PRESSURE: 162 MMHG | HEART RATE: 80 BPM | OXYGEN SATURATION: 97 % | RESPIRATION RATE: 18 BRPM | TEMPERATURE: 97.9 F | WEIGHT: 149 LBS

## 2024-04-30 DIAGNOSIS — R00.2 PALPITATIONS: Primary | ICD-10-CM

## 2024-04-30 DIAGNOSIS — R42 LIGHTHEADEDNESS: ICD-10-CM

## 2024-04-30 DIAGNOSIS — I48.20 CHRONIC ATRIAL FIBRILLATION (MULTI): ICD-10-CM

## 2024-04-30 LAB
ALBUMIN SERPL BCP-MCNC: 4.5 G/DL (ref 3.4–5)
ALP SERPL-CCNC: 59 U/L (ref 33–136)
ALT SERPL W P-5'-P-CCNC: 21 U/L (ref 7–45)
ANION GAP SERPL CALC-SCNC: 13 MMOL/L (ref 10–20)
AST SERPL W P-5'-P-CCNC: 24 U/L (ref 9–39)
BASOPHILS # BLD AUTO: 0.04 X10*3/UL (ref 0–0.1)
BASOPHILS NFR BLD AUTO: 1.1 %
BILIRUB SERPL-MCNC: 0.5 MG/DL (ref 0–1.2)
BNP SERPL-MCNC: 153 PG/ML (ref 0–99)
BUN SERPL-MCNC: 17 MG/DL (ref 6–23)
CALCIUM SERPL-MCNC: 9.3 MG/DL (ref 8.6–10.3)
CARDIAC TROPONIN I PNL SERPL HS: 8 NG/L (ref 0–13)
CARDIAC TROPONIN I PNL SERPL HS: <3 NG/L (ref 0–13)
CARDIAC TROPONIN I PNL SERPL HS: <3 NG/L (ref 0–13)
CHLORIDE SERPL-SCNC: 102 MMOL/L (ref 98–107)
CO2 SERPL-SCNC: 28 MMOL/L (ref 21–32)
CREAT SERPL-MCNC: 0.83 MG/DL (ref 0.5–1.05)
EGFRCR SERPLBLD CKD-EPI 2021: 71 ML/MIN/1.73M*2
EOSINOPHIL # BLD AUTO: 0.09 X10*3/UL (ref 0–0.4)
EOSINOPHIL NFR BLD AUTO: 2.6 %
ERYTHROCYTE [DISTWIDTH] IN BLOOD BY AUTOMATED COUNT: 13.5 % (ref 11.5–14.5)
GLUCOSE BLD MANUAL STRIP-MCNC: 112 MG/DL (ref 74–99)
GLUCOSE SERPL-MCNC: 110 MG/DL (ref 74–99)
HCT VFR BLD AUTO: 38.4 % (ref 36–46)
HGB BLD-MCNC: 12.9 G/DL (ref 12–16)
IMM GRANULOCYTES # BLD AUTO: 0.02 X10*3/UL (ref 0–0.5)
IMM GRANULOCYTES NFR BLD AUTO: 0.6 % (ref 0–0.9)
INR PPP: 1.7 (ref 0.9–1.1)
LYMPHOCYTES # BLD AUTO: 1.32 X10*3/UL (ref 0.8–3)
LYMPHOCYTES NFR BLD AUTO: 37.6 %
MAGNESIUM SERPL-MCNC: 2.1 MG/DL (ref 1.6–2.4)
MCH RBC QN AUTO: 30.1 PG (ref 26–34)
MCHC RBC AUTO-ENTMCNC: 33.6 G/DL (ref 32–36)
MCV RBC AUTO: 90 FL (ref 80–100)
MONOCYTES # BLD AUTO: 0.21 X10*3/UL (ref 0.05–0.8)
MONOCYTES NFR BLD AUTO: 6 %
NEUTROPHILS # BLD AUTO: 1.83 X10*3/UL (ref 1.6–5.5)
NEUTROPHILS NFR BLD AUTO: 52.1 %
NRBC BLD-RTO: 0 /100 WBCS (ref 0–0)
PLATELET # BLD AUTO: 208 X10*3/UL (ref 150–450)
POTASSIUM SERPL-SCNC: 4.3 MMOL/L (ref 3.5–5.3)
PROT SERPL-MCNC: 7.7 G/DL (ref 6.4–8.2)
PROTHROMBIN TIME: 19.6 SECONDS (ref 9.8–12.8)
Q ONSET: 220 MS
QRS COUNT: 17 BEATS
QRS DURATION: 76 MS
QT INTERVAL: 346 MS
QTC CALCULATION(BAZETT): 453 MS
QTC FREDERICIA: 414 MS
R AXIS: 80 DEGREES
RBC # BLD AUTO: 4.29 X10*6/UL (ref 4–5.2)
SODIUM SERPL-SCNC: 139 MMOL/L (ref 136–145)
T AXIS: 17 DEGREES
T OFFSET: 393 MS
VENTRICULAR RATE: 103 BPM
WBC # BLD AUTO: 3.5 X10*3/UL (ref 4.4–11.3)

## 2024-04-30 PROCEDURE — 93005 ELECTROCARDIOGRAM TRACING: CPT

## 2024-04-30 PROCEDURE — 93244 EXT ECG>48HR<7D REV&INTERPJ: CPT | Performed by: INTERNAL MEDICINE

## 2024-04-30 PROCEDURE — 71046 X-RAY EXAM CHEST 2 VIEWS: CPT

## 2024-04-30 PROCEDURE — 2500000004 HC RX 250 GENERAL PHARMACY W/ HCPCS (ALT 636 FOR OP/ED)

## 2024-04-30 PROCEDURE — 84484 ASSAY OF TROPONIN QUANT: CPT | Performed by: SURGERY

## 2024-04-30 PROCEDURE — 82947 ASSAY GLUCOSE BLOOD QUANT: CPT | Mod: 59

## 2024-04-30 PROCEDURE — 71046 X-RAY EXAM CHEST 2 VIEWS: CPT | Performed by: RADIOLOGY

## 2024-04-30 PROCEDURE — 85610 PROTHROMBIN TIME: CPT | Performed by: SURGERY

## 2024-04-30 PROCEDURE — 96361 HYDRATE IV INFUSION ADD-ON: CPT

## 2024-04-30 PROCEDURE — 99284 EMERGENCY DEPT VISIT MOD MDM: CPT | Mod: 25

## 2024-04-30 PROCEDURE — 96374 THER/PROPH/DIAG INJ IV PUSH: CPT

## 2024-04-30 PROCEDURE — 93242 EXT ECG>48HR<7D RECORDING: CPT

## 2024-04-30 PROCEDURE — 85025 COMPLETE CBC W/AUTO DIFF WBC: CPT | Performed by: SURGERY

## 2024-04-30 PROCEDURE — 2500000005 HC RX 250 GENERAL PHARMACY W/O HCPCS

## 2024-04-30 PROCEDURE — 82947 ASSAY GLUCOSE BLOOD QUANT: CPT

## 2024-04-30 PROCEDURE — 80053 COMPREHEN METABOLIC PANEL: CPT | Performed by: SURGERY

## 2024-04-30 PROCEDURE — 84484 ASSAY OF TROPONIN QUANT: CPT

## 2024-04-30 PROCEDURE — 36415 COLL VENOUS BLD VENIPUNCTURE: CPT | Performed by: SURGERY

## 2024-04-30 PROCEDURE — 83880 ASSAY OF NATRIURETIC PEPTIDE: CPT | Performed by: SURGERY

## 2024-04-30 PROCEDURE — 83735 ASSAY OF MAGNESIUM: CPT | Performed by: SURGERY

## 2024-04-30 RX ORDER — METOPROLOL TARTRATE 1 MG/ML
5 INJECTION, SOLUTION INTRAVENOUS ONCE
Status: COMPLETED | OUTPATIENT
Start: 2024-04-30 | End: 2024-04-30

## 2024-04-30 RX ADMIN — METOPROLOL TARTRATE 5 MG: 5 INJECTION INTRAVENOUS at 11:07

## 2024-04-30 RX ADMIN — SODIUM CHLORIDE, POTASSIUM CHLORIDE, SODIUM LACTATE AND CALCIUM CHLORIDE 500 ML: 600; 310; 30; 20 INJECTION, SOLUTION INTRAVENOUS at 12:43

## 2024-04-30 ASSESSMENT — PAIN DESCRIPTION - FREQUENCY: FREQUENCY: CONSTANT/CONTINUOUS

## 2024-04-30 ASSESSMENT — PAIN DESCRIPTION - ORIENTATION: ORIENTATION: MID

## 2024-04-30 ASSESSMENT — PAIN SCALES - GENERAL
PAINLEVEL_OUTOF10: 3
PAINLEVEL_OUTOF10: 5 - MODERATE PAIN

## 2024-04-30 ASSESSMENT — PAIN DESCRIPTION - DESCRIPTORS
DESCRIPTORS: PRESSURE
DESCRIPTORS: HEAVINESS

## 2024-04-30 ASSESSMENT — COLUMBIA-SUICIDE SEVERITY RATING SCALE - C-SSRS
2. HAVE YOU ACTUALLY HAD ANY THOUGHTS OF KILLING YOURSELF?: NO
6. HAVE YOU EVER DONE ANYTHING, STARTED TO DO ANYTHING, OR PREPARED TO DO ANYTHING TO END YOUR LIFE?: NO
1. IN THE PAST MONTH, HAVE YOU WISHED YOU WERE DEAD OR WISHED YOU COULD GO TO SLEEP AND NOT WAKE UP?: NO

## 2024-04-30 ASSESSMENT — PAIN DESCRIPTION - PROGRESSION: CLINICAL_PROGRESSION: NOT CHANGED

## 2024-04-30 ASSESSMENT — PAIN - FUNCTIONAL ASSESSMENT: PAIN_FUNCTIONAL_ASSESSMENT: 0-10

## 2024-04-30 ASSESSMENT — PAIN DESCRIPTION - ONSET: ONSET: AWAKENED FROM SLEEP

## 2024-04-30 ASSESSMENT — PAIN DESCRIPTION - LOCATION: LOCATION: CHEST

## 2024-04-30 NOTE — ED PROVIDER NOTES
CC: Chest Pain and Dizziness (Pt states that she developed mid sternal chest pain when she woke up this morning.  Pt states that she also has dizziness and light headedness.  Pt states that she has been under a lot of stress lately due to the passing of her son)     HPI:  Patient is an 83-year-old female with a past medical history of atrial fibrillation on Eliquis, tricuspid valve repair, bypass surgery in 2019, hypertension, hx of breast cancer s/p radiation who presents to the emergency department today with concern for dizziness and palpitations.  Patient denies any shortness of breath or chest pain though chest pain is noted in the triage note patient denies any chest pain.  Patient denies any fever, chills, abdominal pain, nausea, vomiting, diarrhea, any other symptoms.  Denies any headache or changes in patient's.  States that she does feel lightheaded and feels like her heart is beating irregularly.  States that even with her history of atrial fibrillation she has not felt this way before.  Denies any recent illness or changes in medications.  Does state that she has been under a lot of stress over the last month due to the recent passing of her son.  No other associated signs or symptoms reported at this time.    Limitations to History: none  Additional History provided by: N/A    External Records Reviewed:  Recent available ED and inpatient notes reviewed in EMR.  I reviewed cardiology visit notes from 10/23/2023  Last EF normal 55%    PMHx/PSHx:  Per HPI.   - has a past medical history of Unspecified benign mammary dysplasia of unspecified breast (11/18/2021) and Vitamin D deficiency, unspecified (04/21/2022).  - has a past surgical history that includes Breast biopsy (11/18/2021); Dilation and curettage of uterus (11/18/2021); Rotator cuff repair (11/18/2021); and Tubal ligation (11/18/2021).    Medications:  Reviewed in EMR. See EMR for complete list of medications and doses.    Allergies:  Ace  inhibitors    Social History:  - Tobacco:  reports that she has never smoked. She has never used smokeless tobacco.   - Alcohol:  reports current alcohol use of about 3.0 standard drinks of alcohol per week.   - Illicit Drugs:  reports no history of drug use.     ROS:  Per HPI.     ???????????????????????????????????????????????????????????????  Triage Vitals:  T 36.6 °C (97.9 °F)  HR (!) 131  BP (!) 140/95  RR 16  O2 100 % None (Room air)    Physical Exam  Constitutional:       General: She is not in acute distress.     Appearance: Normal appearance. She is not toxic-appearing.   HENT:      Head: Normocephalic and atraumatic.      Mouth/Throat:      Mouth: Mucous membranes are moist.   Eyes:      General: No scleral icterus.     Conjunctiva/sclera: Conjunctivae normal.   Cardiovascular:      Rate and Rhythm: Rhythm irregular.      Pulses: Normal pulses.   Pulmonary:      Effort: Pulmonary effort is normal.      Breath sounds: Normal breath sounds.   Chest:      Chest wall: No tenderness.   Abdominal:      General: There is no distension.      Palpations: Abdomen is soft.      Tenderness: There is no abdominal tenderness.   Musculoskeletal:         General: Normal range of motion.      Cervical back: Normal range of motion and neck supple.   Skin:     General: Skin is warm and dry.   Neurological:      General: No focal deficit present.      Mental Status: She is alert and oriented to person, place, and time.   Psychiatric:         Mood and Affect: Mood normal.         Behavior: Behavior normal.         Thought Content: Thought content normal.         Judgment: Judgment normal.       ???????????????????????????????????????????????????????????????  ED Course:  Diagnoses as of 04/30/24 1509   Palpitations   Lightheadedness   Chronic atrial fibrillation (Multi)       EKG & Images:  Independently reviewed, See ED Course      MDM:  -Patient is an 83-year-old female with the above past medical history who presented to  the emergency department today with concern for palpitations and lightheadedness and just not feeling right.  EKG was reviewed, patient was in atrial fibrillation with a rate of 103, while the patient was on the monitor during our conversation the patient's heart rate fluctuated between 120s and 30s, was given a dose of IV metoprolol 5 mg with reduction in rate into the 70s and 80s.  Patient's troponin x 3 were negative.  Chest x-ray reassuring.  BNP slightly elevated however the patient's not short of breath or having any chest pain and symptoms inconsistent with CHF, chest x-ray also negative.  Patient does have Lasix at home that she uses periodically for leg swelling, has not had any significant swelling in her legs and has not taken it in several days.  Patient is slightly leukopenic, similar to patient's recent outpatient lab work.  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  I spoke to Dr. Walter Kaur who was on-call for Dr. Fuentes Pedraza about this patient's care who recommended that the patient be placed on a Holter monitor for 7 days prior to the appointment and if a sooner appointment is available they would contact the patient.  They also recommended that the patient be sent home on metoprolol succinate 25 mg however the patient's heart rate dropped into the 70s and 80s after a 5 mg dose here in the emergency department, has a history of heart rate dropping into the 40s and 50s and does not have a way to monitor her heart rate at home.  Patient symptomatically feels better, will hold off on sending the patient home with metoprolol until further evaluation by cardiology.      Patient was reassured here in the emergency department as she was unsure if this was something that she would have to stay in the hospital for.  I updated the patient on her results and stated emergent cardiac causes in addition to emergent reasons to stay in the hospital were clinically ruled out, if she were to come into  "the hospital if they would just monitor her as they are monitoring her with a Holter monitor.  Patient states she is comfortable going home, if symptoms worsen she will return to the hospital.  I provided her with strict return precautions for which she is agreeable.  Patient's  at bedside is also agreeable with this.  Patient discharged home in stable condition.    Of note the patient does know a stressor in her life, son passed away approximately a month ago his birthday was yesterday.  Patient was tearful at 1 point during her examination however has been reassured.  Holter monitor was placed for 7 days.  Has a cardiology appointment on May 14.  Final diagnoses:   [R00.2] Palpitations   [R42] Lightheadedness   [I48.20] Chronic atrial fibrillation (Multi)         Social Determinants Limiting Care:  None identified    Disposition:  Discharge    Kirsten \"Nicolas\" Wilma  Emergency Medicine Resident, PGY1  [facility]   This patient was seen and staffed with Dr. Mcgregor    Disclaimer: This note was dictated by speech recognition. Minor errors in transcription may be present    Procedures ? DrinkSendo last updated 4/30/2024 3:09 PM        Kirsten Dillon,   Resident  04/30/24 1510    "

## 2024-04-30 NOTE — DISCHARGE INSTRUCTIONS
You came into the emergency department today with concern for lightheadedness in the setting of your heart palpating or feeling different.  You were evaluated here in the emergency department, your EKG showed that you are in atrial fibrillation similar to when you presented/were evaluated by her cardiologist in October 2023.  Your rate was a little higher we did give you a dose of metoprolol which brought your weight back down, your rate was in between the 70s and 80s during our conversation after you did receive your metoprolol.  I spoke to Dr. Walter Kaur you recommended that you be placed on a Holter monitor for the next 7 days to be evaluated by your cardiologist during your appointment.  If you do not hear from your cardiologist in regards to setting up an appointment sooner I would give them a call to see if there is any earlier appointment available for you to be evaluated.  If your symptoms worsen, experienced with dizziness, noticed that your blood pressure is dropping, have chest pain, shortness of breath, change in mentation or any other concerning symptoms please return back to the emergency department.

## 2024-04-30 NOTE — ED TRIAGE NOTES
As provider-in-triage, I performed a medical screening history and physical exam on this patient.    HISTORY OF PRESENT ILLNESS  This is an 83-year-old female with a history of open heart surgery, ablation, valve repair, A-fib, HTN, CHF presenting with chest pain and lightheadedness that started an hour ago.  Patient explains she has left-sided chest pain that feels like a pounding on the left side.  The pain is nonradiating.  Endorses feeling lightheaded at rest.  Also explains that she has associated increased shortness of breath.  Denies headache, vision changes, or weakness.  No abdominal pain NVD.  Patient explains that she has been under a lot of stress as she did just lose her son suddenly in an accident.  Denies numbness or tingling.      PHYSICAL EXAM  Vital Signs reviewed in triage patient's heart rate varying from 109-135.  Patient is speaking in full sentences in no acute distress.  She is fully ambulatory without antalgic gait no focal weaknesses.  No NIH is 0.  PERRLA, EOMI, TMs clear bilaterally.  Heart rate is irregular.  Lungs clear to auscultation bilaterally.  Abdomen soft nontender.  No distal edema.  Neurovascular intact     MDM  Cardiac workup, EKG shows afib RVR     I evaluated this patient in triage with the RN. Due to the patients complaint labs and or imaging were ordered by myself in an attempt to expedite patient care however I am not participating in care after evaluation. This is a preliminary assessment. Pt does not appear in acute distress at this time. They are stable and will have a full evaluation as soon as possible. They will be cared for by another provider who will possibly order more labs, imaging or interventions.

## 2024-05-01 LAB
Q ONSET: 224 MS
QRS COUNT: 14 BEATS
QRS DURATION: 84 MS
QT INTERVAL: 400 MS
QTC CALCULATION(BAZETT): 476 MS
QTC FREDERICIA: 449 MS
R AXIS: 72 DEGREES
T AXIS: 18 DEGREES
T OFFSET: 424 MS
VENTRICULAR RATE: 85 BPM

## 2024-05-02 NOTE — PROGRESS NOTES
Sirisha Whyte female   1940 83 y.o.   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 2021 Elma Keith performed right breast Magseed localized lumpectomy. Final pathology revealed right breast DCIS, intermediate to high grade, margins negative. 2021 she completed post-operative radiation under the direction of Christina Wilson. She met with Camilla Arcos to discuss endocrine therapy, but declined at that time.      She has personal history of right breast core biopsy demonstrating atypical ductal hyperplasia (ADH) in 2012. 2012, Basia Mandel performed right breast excisional biopsy demonstrating flat epithelial atypia (FEA) and focal ADH. She states she did not follow up with a high risk provider. She has no family history of breast cancer.     She presents today for annual mammogram and exam. She denies any new masses or lumps. She reports her son recently passed away unexpectedly. She is tearful at today's visit. She questions continuing annual mammograms.      BREAST IMAGIN2023 Bilateral diagnostic mammogram, indicates BI-RADS Category 2.      FEMALE HISTORY: menarche age 14, , first birth age 27,  x 3 months, OCP's x 20 years, natural menopause age 48, no HRT, heterogeneously dense tissue     FAMILY CANCER HISTORY:  None      Surgical History  She has a past surgical history that includes Dilation and curettage of uterus (2021); Rotator cuff repair (2021); Tubal ligation (2021); and Breast lumpectomy (Right).     Social History  She reports that she has never smoked. She has never used smokeless tobacco. She reports current alcohol use of about 3.0 standard drinks of alcohol per week. She reports that she does not use  drugs.    Family History  Family History   Problem Relation Name Age of Onset    Hypertension Mother          Allergies  Ace inhibitors    Medications  Current Outpatient Medications   Medication Instructions    acetaminophen (TYLENOL) 650 mg, oral, Every 4 hours PRN    amLODIPine (Norvasc) 5 mg tablet TAKE 1 TABLET DAILY    apixaban (ELIQUIS) 5 mg, oral, 2 times daily    atenolol (TENORMIN) 50 mg, oral, Daily    atorvastatin (LIPITOR) 20 mg, oral, Daily    cholecalciferol (Vitamin D-3) 50 mcg (2,000 unit) capsule TAKE 1 CAPSULE Daily    furosemide (Lasix) 20 mg tablet TAKE 1 TABLET DAILY AS NEEDED FOR WEIGHT GAIN MORE THAN 2 TO 3 POUNDS IN TWO DAYS    multivit with minerals/lutein (VISION PLUS LUTEIN ORAL) 1 capsule, oral, Daily    polyethylene glycol (GLYCOLAX, MIRALAX) 17 g, oral, Every 12 hours PRN    potassium chloride CR 10 mEq ER tablet Take 1 tablet daily    sertraline (ZOLOFT) 50 mg, oral, Daily    wheat dextrin (BENEFIBER HEALTHY SHAPE ORAL) oral, Benefiber oral powder ---use as directed    zolpidem (AMBIEN) 5 mg, oral, Nightly         REVIEW OF SYSTEMS    Constitutional:  Negative for appetite change, fatigue, fever and unexpected weight change.   HENT:  Negative for ear pain, hearing loss, nosebleeds, sore throat and trouble swallowing.    Eyes:  Negative for discharge, itching and visual disturbance.   Respiratory:  Negative for cough, chest tightness and shortness of breath.    Cardiovascular:  Negative for chest pain, palpitations and leg swelling.   Breast: as indicated in HPI  Gastrointestinal:  Negative for abdominal pain, constipation, diarrhea and nausea.   Endocrine: Negative for cold intolerance and heat intolerance.   Genitourinary:  Negative for dysuria, frequency, hematuria, pelvic pain and vaginal bleeding.   Musculoskeletal:  Negative for arthralgias, back pain, gait problem, joint swelling and myalgias.   Skin:  Negative for color change and rash.   Allergic/Immunologic: Negative for  environmental allergies and food allergies.   Neurological:  Negative for dizziness, tremors, speech difficulty, weakness, numbness and headaches.   Hematological:  Does not bruise/bleed easily.   Psychiatric/Behavioral:  Negative for agitation, dysphoric mood and sleep disturbance. The patient is not nervous/anxious.         Past Medical History  She has a past medical history of History of radiation therapy, Unspecified benign mammary dysplasia of unspecified breast (11/18/2021), and Vitamin D deficiency, unspecified (04/21/2022).     Physical Exam  Patient is alert and oriented x3 and in a relaxed and appropriate mood. Her gait is steady and hand grasps are equal. Sclera is clear. The breasts are nearly symmetrical. The tissue is soft without palpable abnormalities, discrete nodules or masses. The right breast, superolateral quadrant has a well-healed excisional biopsy incision with tissue divot. The right breast has a well-healed very vague partial mastectomy incision, central lateral quadrant. The nipples appear normal. The left breast has aging seborrheic keratosis at inframammary fold. The right nipple areolar complex has mild skin thickening secondary to post-operative radiation. There is no cervical, supraclavicular or axillary lymphadenopathy. Heart rate and rhythm normal, S1 and S2 appreciated. The lungs are clear to auscultation bilaterally. Abdomen is soft and non-tender. There is well-healed vertical incision mid chest from previous open heart surgery.       Physical Exam  Chest:              Last Recorded Vitals  Vitals:    05/20/24 1216   BP: 161/84   Pulse: 90   Temp: 35.5 °C (95.9 °F)   SpO2: 96%       Relevant Results   Time was spent viewing digital images of the radiology testing with the patient. I explained the results in depth, along with suggested explanation for follow up recommendations based on the testing results. BI-RADS Category 2    Imaging  Narrative & Impression   Interpreted By:   Ariadne Bhatia,   STUDY:  BI MAMMO BILATERAL SCREENING TOMOSYNTHESIS;  5/20/2024 12:08 pm      ACCESSION NUMBER(S):  [id number]      ORDERING CLINICIAN:  ELIZABETH YAO      INDICATION:  Screening. History of right lumpectomy and radiation.      COMPARISON:  05/08/2023, 04/11/2022.      FINDINGS:  2D and tomosynthesis images were reviewed at 1 mm slice thickness.      Density:  The breast tissue is heterogeneously dense, which may  obscure small masses.      Stable postoperative scarring is seen in the central lateral right  breast at posterior depth. No suspicious masses or calcifications are  identified.      IMPRESSION:  No mammographic evidence of malignancy.      BI-RADS CATEGORY:      BI-RADS Category:  2 Benign.  Recommendation:  Annual Screening.  Recommended Date:  1 Year.  Laterality:  Bilateral.       Assessment/Plan   Normal clinical exam and imaging, history of right DCIS, history of right breast excisional biopsy, ADH and FEA, no family history of breast cancer, heterogeneously dense tissue     Plan: Return in one year for bilateral screening mammogram and office visit. Discussed continuing annual mammograms until at least 5/2026 (five years post diagnosis).     Patient Discussion/Summary  Your clinical examination and imaging are normal. Please return in one year for bilateral screening mammogram and office visit or sooner if you have any problems or concerns.     You can see your health information, review clinical summaries from office visits & test results online when you follow your health with MY  Chart, a personal health record. To sign up go to [email or web address]. If you need assistance with signing up or trouble getting into your account call [facility] Patient Line 24/7 at [phone].    My office phone number is [phone] if you need to get in touch with me or have additional questions or concerns. Thank you for choosing [facility] and trusting me as your  healthcare provider. I look forward to seeing you again at your next office visit. I am honored to be a provider on your health care team and I remain dedicated to helping you achieve your health goals.      Linda Crawford, APRN-CNP

## 2024-05-09 ENCOUNTER — TELEPHONE (OUTPATIENT)
Dept: CARDIOLOGY | Facility: HOSPITAL | Age: 84
End: 2024-05-09
Payer: MEDICARE

## 2024-05-09 DIAGNOSIS — E78.2 MIXED HYPERLIPIDEMIA: Primary | ICD-10-CM

## 2024-05-09 RX ORDER — ATORVASTATIN CALCIUM 20 MG/1
20 TABLET, FILM COATED ORAL DAILY
Qty: 90 TABLET | Refills: 3 | Status: SHIPPED | OUTPATIENT
Start: 2024-05-09

## 2024-05-13 ENCOUNTER — APPOINTMENT (OUTPATIENT)
Dept: SURGICAL ONCOLOGY | Facility: CLINIC | Age: 84
End: 2024-05-13
Payer: MEDICARE

## 2024-05-13 ENCOUNTER — APPOINTMENT (OUTPATIENT)
Dept: RADIOLOGY | Facility: CLINIC | Age: 84
End: 2024-05-13
Payer: MEDICARE

## 2024-05-13 DIAGNOSIS — F41.8 DEPRESSION WITH ANXIETY: ICD-10-CM

## 2024-05-13 RX ORDER — SERTRALINE HYDROCHLORIDE 50 MG/1
50 TABLET, FILM COATED ORAL DAILY
Qty: 90 TABLET | Refills: 0 | Status: SHIPPED | OUTPATIENT
Start: 2024-05-13

## 2024-05-14 ENCOUNTER — OFFICE VISIT (OUTPATIENT)
Dept: CARDIOLOGY | Facility: CLINIC | Age: 84
End: 2024-05-14
Payer: MEDICARE

## 2024-05-14 VITALS
SYSTOLIC BLOOD PRESSURE: 148 MMHG | OXYGEN SATURATION: 96 % | DIASTOLIC BLOOD PRESSURE: 80 MMHG | HEIGHT: 63 IN | BODY MASS INDEX: 26.4 KG/M2 | WEIGHT: 149 LBS | HEART RATE: 87 BPM

## 2024-05-14 DIAGNOSIS — F43.21 GRIEF REACTION: Primary | ICD-10-CM

## 2024-05-14 PROCEDURE — 99214 OFFICE O/P EST MOD 30 MIN: CPT | Performed by: INTERNAL MEDICINE

## 2024-05-14 RX ORDER — ATENOLOL 50 MG/1
50 TABLET ORAL DAILY
Qty: 90 TABLET | Refills: 3 | Status: SHIPPED | OUTPATIENT
Start: 2024-05-14 | End: 2025-05-14

## 2024-05-14 RX ORDER — ATENOLOL 25 MG/1
25 TABLET ORAL DAILY
COMMUNITY
End: 2024-05-14 | Stop reason: WASHOUT

## 2024-05-14 ASSESSMENT — PAIN SCALES - GENERAL: PAINLEVEL: 0-NO PAIN

## 2024-05-14 ASSESSMENT — ENCOUNTER SYMPTOMS
OCCASIONAL FEELINGS OF UNSTEADINESS: 0
LOSS OF SENSATION IN FEET: 0

## 2024-05-14 NOTE — PROGRESS NOTES
Primary Care Physician: Chris Henderson MD  Date of Visit: 05/14/2024  8:00 AM EDT  Location of visit: [facility] [address]     Chief Complaint:   No chief complaint on file.       HPI / Summary:   Sirisha Whyte is a 83 y.o. female presents for followup.   Sadness, pulse races, bp rises, afib, aware of heart pounding at night.  Stable ANDERSON, no hypervolemia.  April 30 ER visit for high heart rate and elevated blood pressure      Specialty Problems          Cardiology Problems    Heart palpitations    Carotid bruit    Moderate mitral regurgitation    Coronary artery disease involving autologous artery coronary bypass graft without angina pectoris     Formatting of this note might be different from the original. History: CAD Assessment: 7/31/2019: CABG x1 (GSV to RCA) Plan: ASA, BB, statin.         Non-rheumatic tricuspid valve insufficiency     Formatting of this note might be different from the original. History: Echo (7/10/2019): Severe (4+) tricuspid valve regurgitation caused by annular dilatation. Mild thickening. Assessment: 7/31/2019: TVr (30 CE) Plan: Daily ASA, diuresis.  Postop echo complete         Right ventricular dysfunction, NYHA class 3     Formatting of this note might be different from the original. History: Presented with ANDERSON & fatigue in the setting of severe TR. Prior to surgery RV gave the appearance of normal function. Used HCTZ to maintain volume balance. Assessment: Is now s/p CABG & TVr - post-op echo uncovered moderate RV systolic dysfx in the setting of a newly competent TV. Remains FVO with mild edema. Plan: Continue po lasix, aldactone, BB         Atrial fibrillation (Multi)    Hyperlipidemia    Hypertension    Restrictive cardiomyopathy (Multi)    Right heart failure (Multi)    Severe tricuspid regurgitation    Sinus bradycardia    Tricuspid valve replaced        Past Medical History:   Diagnosis Date    Unspecified benign mammary dysplasia of unspecified breast 11/18/2021    Atypical  "ductal hyperplasia of breast    Vitamin D deficiency, unspecified 04/21/2022    Vitamin D deficiency, unspecified          Past Surgical History:   Procedure Laterality Date    BREAST BIOPSY  11/18/2021    Biopsy Breast Open    DILATION AND CURETTAGE OF UTERUS  11/18/2021    Dilation And Curettage Of Cervical Stump    ROTATOR CUFF REPAIR  11/18/2021    Rotator Cuff Repair    TUBAL LIGATION  11/18/2021    Tubal Ligation          Social History:   reports that she has never smoked. She has never used smokeless tobacco. She reports current alcohol use of about 3.0 standard drinks of alcohol per week. She reports that she does not use drugs.      Allergies:  Allergies   Allergen Reactions    Ace Inhibitors Angioedema and Swelling       Outpatient Medications:  Current Outpatient Medications   Medication Instructions    acetaminophen (TYLENOL) 650 mg, oral, Every 4 hours PRN    amLODIPine (Norvasc) 5 mg tablet TAKE 1 TABLET DAILY    apixaban (ELIQUIS) 5 mg, oral, 2 times daily    atenolol (TENORMIN) 25 mg, oral, Daily, Unsure of dose    atorvastatin (LIPITOR) 20 mg, oral, Daily    cholecalciferol (Vitamin D-3) 50 mcg (2,000 unit) capsule TAKE 1 CAPSULE Daily    furosemide (Lasix) 20 mg tablet TAKE 1 TABLET DAILY AS NEEDED FOR WEIGHT GAIN MORE THAN 2 TO 3 POUNDS IN TWO DAYS    multivit with minerals/lutein (VISION PLUS LUTEIN ORAL) 1 capsule, oral, Daily    polyethylene glycol (GLYCOLAX, MIRALAX) 17 g, oral, Every 12 hours PRN    potassium chloride CR 10 mEq ER tablet Take 1 tablet daily    sertraline (ZOLOFT) 50 mg, oral, Daily    wheat dextrin (BENEFIBER HEALTHY SHAPE ORAL) oral, Benefiber oral powder ---use as directed    zolpidem (AMBIEN) 5 mg, oral, Nightly       ROS     Physical Exam:  Vitals:    05/14/24 0804   BP: 148/80   BP Location: Right arm   Patient Position: Sitting   Pulse: 87   SpO2: 96%   Weight: 67.6 kg (149 lb)   Height: 1.6 m (5' 3\")     Wt Readings from Last 5 Encounters:   05/14/24 67.6 kg (149 lb) "   04/30/24 67.6 kg (149 lb)   10/23/23 67.8 kg (149 lb 8 oz)   08/08/23 65.8 kg (145 lb)   05/08/23 68.6 kg (151 lb 4 oz)     Body mass index is 26.39 kg/m².     Withdrawn appearing female.  Neck veins are flat.  Rhythm is irregularly irregular with no murmur.  Clear lungs.  Soft abdomen  No dependent edema  Last Labs:  CMP:  Recent Labs     04/30/24  0915 08/08/23  1320 11/21/22  1055 04/21/22  1110 05/06/21  1141    140 137 139 140   K 4.3 4.6 4.7 4.1 4.2    100 102 100 100   CO2 28 30 27 30 31   ANIONGAP 13 15 13 13 13   BUN 17 14 21 15 14   CREATININE 0.83 0.99 1.02 0.88 0.85   EGFR 71  --   --   --   --    GLUCOSE 110* 90 83 92 95     Recent Labs     04/30/24  0915 08/08/23  1320 11/21/22  1055 04/21/22  1110 10/13/20  1305   ALBUMIN 4.5 4.7 4.5 4.6 4.4   ALKPHOS 59 59 59 75 66   ALT 21 18 22 21 24   AST 24 22 23 24 29   BILITOT 0.5 0.7 0.7 0.7 0.7     CBC:  Recent Labs     04/30/24  0915 08/08/23  1320 11/21/22  1055 04/21/22  1110 05/06/21  1141   WBC 3.5* 4.2* 3.6* 4.0* 4.6   HGB 12.9 12.7 12.4 13.2 13.1   HCT 38.4 40.4 39.3 40.4 40.7    211 185 219 254   MCV 90 96 95 94 95     COAG:   Recent Labs     04/30/24  0915 08/22/19  1019 06/26/18  1506   INR 1.7* 1.5* 1.3*     HEME/ENDO:  Recent Labs     11/21/22  1055 10/13/20  1305   TSH 1.49 1.92      CARDIAC:   Recent Labs     04/30/24  1107 04/30/24  1000 04/30/24  0915   TROPHS <3 8 <3   BNP  --   --  153*     Recent Labs     08/08/23  1320 11/21/22  1055 04/21/22  1110   CHOL 189 186 192   LDLF 77 80 76   HDL 96.9 93.1 106.5   TRIG 75 63 50       Last Cardiology Tests:  ECG:      Echo:  Echo Results:  No results found for this or any previous visit from the past 3650 days.       Cath:      Stress Test:  Stress Results:  No results found for this or any previous visit from the past 365 days.         Cardiac Imaging:  ECG 12 Lead  Atrial fibrillation with a competing junctional pacemaker with premature ventricular or aberrantly conducted  complexes  Abnormal ECG  When compared with ECG of 30-APR-2024 09:01,  Nonspecific T wave abnormality no longer evident in Anterior leads  See ED provider note for full interpretation and clinical correlation  Confirmed by Alaina Perez ([id number]) on 5/1/2024 4:50:38 PM        Assessment/Plan       Given history of prior valve replacement in the setting of significant TR and atrial enlargement I think efforts to reestablish sinus rhythm may be problematic and likely not to succeed.  I would advocate rate control strategy in the situation and will increase her atenolol to 50 mg follow-up phone call pending review of cardiac monitor.  Increased atenolol may also improve blood pressure control I will see her back in 3 months.  I also suggested referral to psychiatric services for grief counseling.  I placed a referral asked her to call me if she has no follow-up phone call in a few weeks.    Orders:  No orders of the defined types were placed in this encounter.     Followup Appts:  Future Appointments   Date Time Provider Department Center   5/20/2024 11:30 AM [address]   5/20/2024 12:30 PM Linda Crawford, APRN-CNP [id number] [facility]   6/24/2024 10:30 AM Chris Henderson MD [id number] [facility]           ____________________________________________________________  Fuentes Pedraza MD    Senior Attending Physician  [facility]  [facility]

## 2024-05-15 LAB — BODY SURFACE AREA: 1.73 M2

## 2024-05-20 ENCOUNTER — HOSPITAL ENCOUNTER (OUTPATIENT)
Dept: RADIOLOGY | Facility: CLINIC | Age: 84
Discharge: HOME | End: 2024-05-20
Payer: MEDICARE

## 2024-05-20 ENCOUNTER — OFFICE VISIT (OUTPATIENT)
Dept: SURGICAL ONCOLOGY | Facility: CLINIC | Age: 84
End: 2024-05-20
Payer: MEDICARE

## 2024-05-20 VITALS
BODY MASS INDEX: 27 KG/M2 | TEMPERATURE: 95.9 F | WEIGHT: 152.4 LBS | OXYGEN SATURATION: 96 % | DIASTOLIC BLOOD PRESSURE: 84 MMHG | SYSTOLIC BLOOD PRESSURE: 161 MMHG | HEART RATE: 90 BPM

## 2024-05-20 VITALS — WEIGHT: 147 LBS | BODY MASS INDEX: 26.04 KG/M2

## 2024-05-20 DIAGNOSIS — Z12.31 ENCOUNTER FOR SCREENING MAMMOGRAM FOR MALIGNANT NEOPLASM OF BREAST: ICD-10-CM

## 2024-05-20 DIAGNOSIS — Z85.3 ENCOUNTER FOR FOLLOW-UP SURVEILLANCE OF BREAST CANCER: Primary | ICD-10-CM

## 2024-05-20 DIAGNOSIS — Z08 ENCOUNTER FOR FOLLOW-UP SURVEILLANCE OF BREAST CANCER: Primary | ICD-10-CM

## 2024-05-20 PROCEDURE — 99213 OFFICE O/P EST LOW 20 MIN: CPT | Performed by: NURSE PRACTITIONER

## 2024-05-20 PROCEDURE — 3077F SYST BP >= 140 MM HG: CPT | Performed by: NURSE PRACTITIONER

## 2024-05-20 PROCEDURE — 77067 SCR MAMMO BI INCL CAD: CPT | Mod: BILATERAL PROCEDURE | Performed by: STUDENT IN AN ORGANIZED HEALTH CARE EDUCATION/TRAINING PROGRAM

## 2024-05-20 PROCEDURE — 77063 BREAST TOMOSYNTHESIS BI: CPT | Mod: BILATERAL PROCEDURE | Performed by: STUDENT IN AN ORGANIZED HEALTH CARE EDUCATION/TRAINING PROGRAM

## 2024-05-20 PROCEDURE — 1036F TOBACCO NON-USER: CPT | Performed by: NURSE PRACTITIONER

## 2024-05-20 PROCEDURE — 1160F RVW MEDS BY RX/DR IN RCRD: CPT | Performed by: NURSE PRACTITIONER

## 2024-05-20 PROCEDURE — 77067 SCR MAMMO BI INCL CAD: CPT

## 2024-05-20 PROCEDURE — 3079F DIAST BP 80-89 MM HG: CPT | Performed by: NURSE PRACTITIONER

## 2024-05-20 PROCEDURE — 1126F AMNT PAIN NOTED NONE PRSNT: CPT | Performed by: NURSE PRACTITIONER

## 2024-05-20 PROCEDURE — 1159F MED LIST DOCD IN RCRD: CPT | Performed by: NURSE PRACTITIONER

## 2024-05-20 ASSESSMENT — PAIN SCALES - GENERAL: PAINLEVEL: 0-NO PAIN

## 2024-05-20 NOTE — PATIENT INSTRUCTIONS
Your clinical examination and imaging are normal. Please return in one year for bilateral screening mammogram and office visit or sooner if you have any problems or concerns.     You can see your health information, review clinical summaries from office visits & test results online when you follow your health with MY  Chart, a personal health record. To sign up go to [email or web address]. If you need assistance with signing up or trouble getting into your account call [facility] Patient Line 24/7 at [phone].    My office phone number is [phone] if you need to get in touch with me or have additional questions or concerns. Thank you for choosing [facility] and trusting me as your healthcare provider. I look forward to seeing you again at your next office visit. I am honored to be a provider on your health care team and I remain dedicated to helping you achieve your health goals.

## 2024-06-10 ENCOUNTER — APPOINTMENT (OUTPATIENT)
Dept: CARDIOLOGY | Facility: CLINIC | Age: 84
End: 2024-06-10
Payer: MEDICARE

## 2024-06-24 ENCOUNTER — APPOINTMENT (OUTPATIENT)
Dept: PRIMARY CARE | Facility: CLINIC | Age: 84
End: 2024-06-24
Payer: MEDICARE

## 2024-06-24 VITALS
HEIGHT: 63 IN | BODY MASS INDEX: 26.58 KG/M2 | WEIGHT: 150 LBS | SYSTOLIC BLOOD PRESSURE: 130 MMHG | DIASTOLIC BLOOD PRESSURE: 81 MMHG | HEART RATE: 86 BPM

## 2024-06-24 DIAGNOSIS — Z51.81 ENCOUNTER FOR THERAPEUTIC DRUG LEVEL MONITORING: ICD-10-CM

## 2024-06-24 DIAGNOSIS — E78.9 ABNORMAL CHOLESTEROL TEST: ICD-10-CM

## 2024-06-24 DIAGNOSIS — F41.8 DEPRESSION WITH ANXIETY: ICD-10-CM

## 2024-06-24 DIAGNOSIS — F32.A DEPRESSION, UNSPECIFIED DEPRESSION TYPE: Primary | ICD-10-CM

## 2024-06-24 PROBLEM — Z98.890 HISTORY OF THORACIC SURGERY: Status: ACTIVE | Noted: 2024-06-24

## 2024-06-24 PROBLEM — N60.99 ATYPICAL DUCTAL HYPERPLASIA OF BREAST: Status: ACTIVE | Noted: 2024-06-24

## 2024-06-24 PROBLEM — Z85.3 HISTORY OF MALIGNANT NEOPLASM OF BREAST: Status: ACTIVE | Noted: 2024-06-24

## 2024-06-24 LAB
ALBUMIN SERPL BCP-MCNC: 4.8 G/DL (ref 3.4–5)
ALP SERPL-CCNC: 65 U/L (ref 33–136)
ALT SERPL W P-5'-P-CCNC: 21 U/L (ref 7–45)
AMPHETAMINES UR QL SCN: NORMAL
ANION GAP SERPL CALC-SCNC: 15 MMOL/L (ref 10–20)
AST SERPL W P-5'-P-CCNC: 21 U/L (ref 9–39)
BARBITURATES UR QL SCN: NORMAL
BENZODIAZ UR QL SCN: NORMAL
BILIRUB SERPL-MCNC: 0.6 MG/DL (ref 0–1.2)
BUN SERPL-MCNC: 17 MG/DL (ref 6–23)
BZE UR QL SCN: NORMAL
CALCIUM SERPL-MCNC: 10.1 MG/DL (ref 8.6–10.6)
CANNABINOIDS UR QL SCN: NORMAL
CHLORIDE SERPL-SCNC: 101 MMOL/L (ref 98–107)
CHOLEST SERPL-MCNC: 196 MG/DL (ref 0–199)
CHOLESTEROL/HDL RATIO: 1.8
CO2 SERPL-SCNC: 26 MMOL/L (ref 21–32)
CREAT SERPL-MCNC: 0.97 MG/DL (ref 0.5–1.05)
EGFRCR SERPLBLD CKD-EPI 2021: 59 ML/MIN/1.73M*2
FENTANYL+NORFENTANYL UR QL SCN: NORMAL
GLUCOSE SERPL-MCNC: 87 MG/DL (ref 74–99)
HDLC SERPL-MCNC: 106.3 MG/DL
LDLC SERPL CALC-MCNC: 74 MG/DL
METHADONE UR QL SCN: NORMAL
NON HDL CHOLESTEROL: 90 MG/DL (ref 0–149)
OPIATES UR QL SCN: NORMAL
OXYCODONE+OXYMORPHONE UR QL SCN: NORMAL
PCP UR QL SCN: NORMAL
POTASSIUM SERPL-SCNC: 4.4 MMOL/L (ref 3.5–5.3)
PROT SERPL-MCNC: 7.6 G/DL (ref 6.4–8.2)
SODIUM SERPL-SCNC: 138 MMOL/L (ref 136–145)
TRIGL SERPL-MCNC: 81 MG/DL (ref 0–149)
VLDL: 16 MG/DL (ref 0–40)

## 2024-06-24 PROCEDURE — 1159F MED LIST DOCD IN RCRD: CPT | Performed by: FAMILY MEDICINE

## 2024-06-24 PROCEDURE — 80053 COMPREHEN METABOLIC PANEL: CPT

## 2024-06-24 PROCEDURE — 36415 COLL VENOUS BLD VENIPUNCTURE: CPT

## 2024-06-24 PROCEDURE — 80061 LIPID PANEL: CPT

## 2024-06-24 PROCEDURE — 3075F SYST BP GE 130 - 139MM HG: CPT | Performed by: FAMILY MEDICINE

## 2024-06-24 PROCEDURE — 1036F TOBACCO NON-USER: CPT | Performed by: FAMILY MEDICINE

## 2024-06-24 PROCEDURE — 1160F RVW MEDS BY RX/DR IN RCRD: CPT | Performed by: FAMILY MEDICINE

## 2024-06-24 PROCEDURE — 1123F ACP DISCUSS/DSCN MKR DOCD: CPT | Performed by: FAMILY MEDICINE

## 2024-06-24 PROCEDURE — 99214 OFFICE O/P EST MOD 30 MIN: CPT | Performed by: FAMILY MEDICINE

## 2024-06-24 PROCEDURE — 80307 DRUG TEST PRSMV CHEM ANLYZR: CPT

## 2024-06-24 PROCEDURE — 3079F DIAST BP 80-89 MM HG: CPT | Performed by: FAMILY MEDICINE

## 2024-06-24 PROCEDURE — 1158F ADVNC CARE PLAN TLK DOCD: CPT | Performed by: FAMILY MEDICINE

## 2024-06-24 RX ORDER — SERTRALINE HYDROCHLORIDE 50 MG/1
75 TABLET, FILM COATED ORAL DAILY
Qty: 135 TABLET | Refills: 0 | Status: SHIPPED | OUTPATIENT
Start: 2024-06-24

## 2024-06-24 ASSESSMENT — ENCOUNTER SYMPTOMS
DEPRESSION: 0
GASTROINTESTINAL NEGATIVE: 1
CARDIOVASCULAR NEGATIVE: 1
MUSCULOSKELETAL NEGATIVE: 1
CONSTITUTIONAL NEGATIVE: 1
DYSPHORIC MOOD: 1
NEUROLOGICAL NEGATIVE: 1
NERVOUS/ANXIOUS: 1
RESPIRATORY NEGATIVE: 1
LOSS OF SENSATION IN FEET: 0
OCCASIONAL FEELINGS OF UNSTEADINESS: 0

## 2024-06-24 NOTE — PROGRESS NOTES
Pt comes in for fu. Pt having a lot of depression. She lost her son in march and her husbands cancer came back. She is going through a lot and unsure how to deal with it all.

## 2024-06-24 NOTE — PROGRESS NOTES
Subjective   Patient ID: Sirisha Whyte is a 83 y.o. female who presents for No chief complaint on file..    HPI   Acute grief reactgion w unexpecgted loss of son , no suicidal or homiocidal ideation   Review of Systems   Constitutional: Negative.    HENT: Negative.     Respiratory: Negative.     Cardiovascular: Negative.    Gastrointestinal: Negative.    Musculoskeletal: Negative.    Neurological: Negative.    Psychiatric/Behavioral:  Positive for dysphoric mood. The patient is nervous/anxious.    OARRS:  No data recorded  I have personally reviewed the OARRS report for Sirisha Whyte. I have considered the risks of abuse, dependence, addiction and diversion and I believe that it is clinically appropriate for Sirisha Whyte to be prescribed this medication    Is the patient prescribed a combination of a benzodiazepine and opioid?  No    Last Urine Drug Screen / ordered today: Yes  Recent Results (from the past 8760 hour(s))   Drug Screen, Urine With Reflex to Confirmation    Collection Time: 08/08/23  1:20 PM   Result Value Ref Range    DRUG SCREEN COMMENT URINE SEE BELOW     Amphetamine Screen, Urine PRESUMPTIVE NEGATIVE NEGATIVE    Barbiturate Screen, Urine PRESUMPTIVE NEGATIVE NEGATIVE    BENZODIAZEPINE (PRESENCE) IN URINE BY SCREEN METHOD PRESUMPTIVE NEGATIVE NEGATIVE    Cannabinoid Screen, Urine PRESUMPTIVE NEGATIVE NEGATIVE    Cocaine Screen, Urine PRESUMPTIVE NEGATIVE NEGATIVE    Fentanyl, Ur PRESUMPTIVE NEGATIVE NEGATIVE    Methadone Screen, Urine PRESUMPTIVE NEGATIVE NEGATIVE    Opiate Screen, Urine PRESUMPTIVE NEGATIVE NEGATIVE    Oxycodone Screen, Ur PRESUMPTIVE NEGATIVE NEGATIVE    PCP Screen, Urine PRESUMPTIVE NEGATIVE NEGATIVE     N/A        Controlled Substance Agreement:  Date of the Last Agreement: 6/24/24  Reviewed Controlled Substance Agreement including but not limited to the benefits, risks, and alternatives to treatment with a Controlled Substance medication(s).    Sleep Aids:   What is  "the patient's goal of therapy? insomnia  Is this being achieved with current treatment? insomnia    Activities of Daily Living:   Is your overall impression that this patient is benefiting (symptom reduction outweighs side effects) from sleep aid therapy? Yes     1. Physical Functioning: Better  2. Family Relationship: Better  3. Social Relationship: Better  4. Mood: Better  5. Sleep Patterns: Better  6. Overall Function: Better      Objective   /81   Pulse 86   Ht 1.6 m (5' 3\")   Wt 68 kg (150 lb)   LMP  (LMP Unknown)   BMI 26.57 kg/m²     Physical Exam  Vitals reviewed.   Constitutional:       Appearance: Normal appearance. She is normal weight.   Eyes:      Extraocular Movements: Extraocular movements intact.      Conjunctiva/sclera: Conjunctivae normal.      Pupils: Pupils are equal, round, and reactive to light.   Cardiovascular:      Rate and Rhythm: Normal rate and regular rhythm.      Pulses: Normal pulses.      Heart sounds: Normal heart sounds.   Pulmonary:      Effort: Pulmonary effort is normal.      Breath sounds: Normal breath sounds.   Abdominal:      General: Bowel sounds are normal.      Palpations: Abdomen is soft.   Musculoskeletal:         General: Normal range of motion.   Skin:     General: Skin is warm and dry.   Neurological:      General: No focal deficit present.      Mental Status: She is alert and oriented to person, place, and time. Mental status is at baseline.       Assessment/Plan   Problem List Items Addressed This Visit    None  Visit Diagnoses         Codes    Depression, unspecified depression type    -  Primary F32.A    Relevant Orders    Comprehensive Metabolic Panel    Depression with anxiety     F41.8    Relevant Medications    sertraline (Zoloft) 50 mg tablet    Other Relevant Orders    Comprehensive Metabolic Panel    Lipid Panel    Referral to [facility]    Abnormal cholesterol test     E78.9    Relevant Orders    Lipid Panel        Pt w " insomnia refill med and fu  Depression ans anxiedty w acute grief reaction will increasee sertraline from 50 ot 75 mg daily and refer to ccounseling for grief w uh thrtough access behavioral  Afibb stable   Htn stable ccc nad fu

## 2024-06-25 ENCOUNTER — TELEPHONE (OUTPATIENT)
Dept: PRIMARY CARE | Facility: CLINIC | Age: 84
End: 2024-06-25
Payer: MEDICARE

## 2024-07-16 ENCOUNTER — TELEPHONE (OUTPATIENT)
Dept: PRIMARY CARE | Facility: CLINIC | Age: 84
End: 2024-07-16
Payer: MEDICARE

## 2024-07-16 NOTE — TELEPHONE ENCOUNTER
Spoke to patient and gave her behavioral health number     Patient states she misplaced the psychiatrist/counselor you referred her to for depression/anxiety. Would like the number.

## 2024-08-13 ENCOUNTER — OFFICE VISIT (OUTPATIENT)
Dept: CARDIOLOGY | Facility: CLINIC | Age: 84
End: 2024-08-13
Payer: MEDICARE

## 2024-08-13 VITALS
BODY MASS INDEX: 27.27 KG/M2 | SYSTOLIC BLOOD PRESSURE: 143 MMHG | HEIGHT: 63 IN | HEART RATE: 76 BPM | OXYGEN SATURATION: 97 % | WEIGHT: 153.9 LBS | DIASTOLIC BLOOD PRESSURE: 86 MMHG

## 2024-08-13 DIAGNOSIS — I10 HYPERTENSION, UNSPECIFIED TYPE: Primary | ICD-10-CM

## 2024-08-13 PROCEDURE — 1036F TOBACCO NON-USER: CPT | Performed by: INTERNAL MEDICINE

## 2024-08-13 PROCEDURE — 1160F RVW MEDS BY RX/DR IN RCRD: CPT | Performed by: INTERNAL MEDICINE

## 2024-08-13 PROCEDURE — 99214 OFFICE O/P EST MOD 30 MIN: CPT | Performed by: INTERNAL MEDICINE

## 2024-08-13 PROCEDURE — 1159F MED LIST DOCD IN RCRD: CPT | Performed by: INTERNAL MEDICINE

## 2024-08-13 PROCEDURE — 3079F DIAST BP 80-89 MM HG: CPT | Performed by: INTERNAL MEDICINE

## 2024-08-13 PROCEDURE — 1126F AMNT PAIN NOTED NONE PRSNT: CPT | Performed by: INTERNAL MEDICINE

## 2024-08-13 PROCEDURE — 3077F SYST BP >= 140 MM HG: CPT | Performed by: INTERNAL MEDICINE

## 2024-08-13 ASSESSMENT — PATIENT HEALTH QUESTIONNAIRE - PHQ9
3. TROUBLE FALLING OR STAYING ASLEEP OR SLEEPING TOO MUCH: NOT AT ALL
SUM OF ALL RESPONSES TO PHQ9 QUESTIONS 1 AND 2: 3
1. LITTLE INTEREST OR PLEASURE IN DOING THINGS: NOT AT ALL
2. FEELING DOWN, DEPRESSED OR HOPELESS: NEARLY EVERY DAY
5. POOR APPETITE OR OVEREATING: NOT AT ALL
10. IF YOU CHECKED OFF ANY PROBLEMS, HOW DIFFICULT HAVE THESE PROBLEMS MADE IT FOR YOU TO DO YOUR WORK, TAKE CARE OF THINGS AT HOME, OR GET ALONG WITH OTHER PEOPLE: NOT DIFFICULT AT ALL
4. FEELING TIRED OR HAVING LITTLE ENERGY: NOT AT ALL
SUM OF ALL RESPONSES TO PHQ QUESTIONS 1-9: 3
9. THOUGHTS THAT YOU WOULD BE BETTER OFF DEAD, OR OF HURTING YOURSELF: NOT AT ALL
7. TROUBLE CONCENTRATING ON THINGS, SUCH AS READING THE NEWSPAPER OR WATCHING TELEVISION: NOT AT ALL
6. FEELING BAD ABOUT YOURSELF - OR THAT YOU ARE A FAILURE OR HAVE LET YOURSELF OR YOUR FAMILY DOWN: NOT AT ALL
8. MOVING OR SPEAKING SO SLOWLY THAT OTHER PEOPLE COULD HAVE NOTICED. OR THE OPPOSITE, BEING SO FIGETY OR RESTLESS THAT YOU HAVE BEEN MOVING AROUND A LOT MORE THAN USUAL: NOT AT ALL

## 2024-08-13 ASSESSMENT — ENCOUNTER SYMPTOMS
DEPRESSION: 0
OCCASIONAL FEELINGS OF UNSTEADINESS: 0
LOSS OF SENSATION IN FEET: 0

## 2024-08-13 ASSESSMENT — PAIN SCALES - GENERAL: PAINLEVEL: 0-NO PAIN

## 2024-08-13 NOTE — PROGRESS NOTES
Primary Care Physician: Chris Henderson MD  Date of Visit: 08/13/2024 10:20 AM EDT  Location of visit: [facility] [place] [address]     Chief Complaint:   Chief Complaint   Patient presents with    Follow-up        HPI / Summary:   Sirisha Whyte is a 83 y.o. female presents for followup.     Persistent atrial fibrillation.  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  Echo August 2023 showing preserved EF moderate left atrial enlargement, mild AI, mild MR, mild TR RV was felt to be at the upper limit of normal function was reduced TAPSE 11 mm, S prime 0.07 tricuspid valve mean gradient of 1 mmHg    She has noticed episodic increases in heart rate at her office visit in May I increased her atenolol to 50 mg she is presenting in office follow-up    Cardiac monitor over 2 weeks showed average heart rate of around 81 bpm 100% atrial for flutter  Son passed 3 mo ago,  diagnosed with colon ca 5 years ago, now recurrent, waiting for tumor board.  daughters in [place] and [place].  No cp, BP up a bit, she is active no limitation.      Specialty Problems          Cardiology Problems    Heart palpitations    Carotid bruit    Moderate mitral regurgitation    Coronary artery disease involving autologous artery coronary bypass graft without angina pectoris     Formatting of this note might be different from the original. History: CAD Assessment: 7/31/2019: CABG x1 (GSV to RCA) Plan: ASA, BB, statin.         Non-rheumatic tricuspid valve insufficiency     Formatting of this note might be different from the original. History: Echo (7/10/2019): Severe (4+) tricuspid valve regurgitation caused by annular dilatation. Mild thickening. Assessment: 7/31/2019: TVr (30 CE) Plan: Daily ASA, diuresis.  Postop echo complete         Right ventricular dysfunction, NYHA class 3      Formatting of this note might be different from the original. History: Presented with ANDERSON & fatigue in the setting of severe TR. Prior to surgery RV gave the appearance of normal function. Used HCTZ to maintain volume balance. Assessment: Is now s/p CABG & TVr - post-op echo uncovered moderate RV systolic dysfx in the setting of a newly competent TV. Remains FVO with mild edema. Plan: Continue po lasix, aldactone, BB         Atrial fibrillation (Multi)    Hyperlipidemia    Hypertension    Restrictive cardiomyopathy (Multi)    Right heart failure (Multi)    Severe tricuspid regurgitation    Sinus bradycardia    Tricuspid valve replaced        Past Medical History:   Diagnosis Date    History of radiation therapy     Unspecified benign mammary dysplasia of unspecified breast 11/18/2021    Atypical ductal hyperplasia of breast    Vitamin D deficiency, unspecified 04/21/2022    Vitamin D deficiency, unspecified          Past Surgical History:   Procedure Laterality Date    BREAST LUMPECTOMY Right     DILATION AND CURETTAGE OF UTERUS  11/18/2021    Dilation And Curettage Of Cervical Stump    ROTATOR CUFF REPAIR  11/18/2021    Rotator Cuff Repair    TUBAL LIGATION  11/18/2021    Tubal Ligation          Social History:   reports that she has never smoked. She has never used smokeless tobacco. She reports current alcohol use of about 3.0 standard drinks of alcohol per week. She reports that she does not use drugs.      Allergies:  Allergies   Allergen Reactions    Ace Inhibitors Angioedema and Swelling       Outpatient Medications:  Current Outpatient Medications   Medication Instructions    acetaminophen (TYLENOL) 650 mg, oral, Every 4 hours PRN    amLODIPine (Norvasc) 5 mg tablet TAKE 1 TABLET DAILY    apixaban (ELIQUIS) 5 mg, oral, 2 times daily    atenolol (TENORMIN) 50 mg, oral, Daily    atorvastatin (LIPITOR) 20 mg, oral, Daily    cholecalciferol (Vitamin D-3) 50 mcg (2,000 unit) capsule Take 1 capsule (50 mcg) by  "mouth early in the morning..    furosemide (Lasix) 20 mg tablet TAKE 1 TABLET DAILY AS NEEDED FOR WEIGHT GAIN MORE THAN 2 TO 3 POUNDS IN TWO DAYS    multivit with minerals/lutein (VISION PLUS LUTEIN ORAL) 1 capsule, oral, Daily    polyethylene glycol (GLYCOLAX, MIRALAX) 17 g, oral, Every 12 hours PRN    potassium chloride CR 10 mEq ER tablet Take 1 tablet (10 mEq) by mouth once daily.    sertraline (ZOLOFT) 75 mg, oral, Daily    wheat dextrin (BENEFIBER HEALTHY SHAPE ORAL) oral, Benefiber oral powder ---use as directed    zolpidem (AMBIEN) 5 mg, oral, Nightly       ROS     Physical Exam:  Vitals:    08/13/24 1028   BP: 143/86   BP Location: Right arm   Patient Position: Sitting   BP Cuff Size: Adult   Pulse: 76   SpO2: 97%   Weight: 69.8 kg (153 lb 14.4 oz)   Height: 1.6 m (5' 3\")     Wt Readings from Last 5 Encounters:   08/13/24 69.8 kg (153 lb 14.4 oz)   06/24/24 68 kg (150 lb)   05/20/24 66.7 kg (147 lb)   05/20/24 69.1 kg (152 lb 6.4 oz)   05/14/24 67.6 kg (149 lb)     Body mass index is 27.26 kg/m².   Flat JVP.  Irregular rhythm no gallop or murmur.  Clear lungs.  Soft abdomen.  Trace pretibial edema     Last Labs:  CMP:  Recent Labs     06/24/24  1112 04/30/24  0915 08/08/23  1320 11/21/22  1055 04/21/22  1110    139 140 137 139   K 4.4 4.3 4.6 4.7 4.1    102 100 102 100   CO2 26 28 30 27 30   ANIONGAP 15 13 15 13 13   BUN 17 17 14 21 15   CREATININE 0.97 0.83 0.99 1.02 0.88   EGFR 59* 71  --   --   --    GLUCOSE 87 110* 90 83 92     Recent Labs     06/24/24  1112 04/30/24  0915 08/08/23  1320 11/21/22  1055 04/21/22  1110   ALBUMIN 4.8 4.5 4.7 4.5 4.6   ALKPHOS 65 59 59 59 75   ALT 21 21 18 22 21   AST 21 24 22 23 24   BILITOT 0.6 0.5 0.7 0.7 0.7     CBC:  Recent Labs     04/30/24  0915 08/08/23  1320 11/21/22  1055 04/21/22  1110 05/06/21  1141   WBC 3.5* 4.2* 3.6* 4.0* 4.6   HGB 12.9 12.7 12.4 13.2 13.1   HCT 38.4 40.4 39.3 40.4 40.7    211 185 219 254   MCV 90 96 95 94 95     COAG: "   Recent Labs     04/30/24  0915 08/22/19  1019 06/26/18  1506   INR 1.7* 1.5* 1.3*     HEME/ENDO:  Recent Labs     11/21/22  1055 10/13/20  1305   TSH 1.49 1.92      CARDIAC:   Recent Labs     04/30/24  1107 04/30/24  1000 04/30/24  0915   TROPHS <3 8 <3   BNP  --   --  153*     Recent Labs     06/24/24  1112 08/08/23  1320 11/21/22  1055 04/21/22  1110   CHOL 196 189 186 192   LDLF  --  77 80 76   .3 96.9 93.1 106.5   TRIG 81 75 63 50       Last Cardiology Tests:  ECG:      Echo:  Echo Results:  No results found for this or any previous visit from the past 3650 days.       Cath:      Stress Test:  Stress Results:  No results found for this or any previous visit from the past 365 days.         Cardiac Imaging:  BI mammo bilateral screening tomosynthesis  Narrative: Interpreted By:  Ariadne Bhatia,   STUDY:  BI MAMMO BILATERAL SCREENING TOMOSYNTHESIS;  5/20/2024 12:08 pm      ACCESSION NUMBER(S):  [id number]      ORDERING CLINICIAN:  ELIZABETH YAO      INDICATION:  Screening. History of right lumpectomy and radiation.      COMPARISON:  05/08/2023, 04/11/2022.      FINDINGS:  2D and tomosynthesis images were reviewed at 1 mm slice thickness.      Density:  The breast tissue is heterogeneously dense, which may  obscure small masses.      Stable postoperative scarring is seen in the central lateral right  breast at posterior depth. No suspicious masses or calcifications are  identified.      Impression: No mammographic evidence of malignancy.      BI-RADS CATEGORY:      BI-RADS Category:  2 Benign.  Recommendation:  Annual Screening.  Recommended Date:  1 Year.  Laterality:  Bilateral.      For any future breast imaging appointments, please call [phone]).      MACRO:  None      Signed by: Ariadne Bhatia 5/20/2024 12:40 PM  Dictation workstation:   [id number]        Assessment/Plan   Persistent atrial fibrillation with controlled ventricular response on anticoagulation, atrial fibrillation  related RV dysfunction and severe TR is successfully treated with tricuspid valve repair in 2019 does have some decrease in RV systolic function but no manifest clinical heart failure at this juncture.  No change in drug treatment advised office follow-up in 6 months continue on current diuretic dosing.  Thank you for allowing me to participate in her care        Orders:  No orders of the defined types were placed in this encounter.     Followup Appts:  Future Appointments   Date Time Provider Department Center   5/22/2025  8:30 AM [facility] 6 [facility]   5/22/2025  9:30 AM Linda Crawford, APRN-CNP [id number]           ____________________________________________________________  Fuentes Pedraza MD    Senior Attending Physician  [facility]  [facility]

## 2024-08-26 DIAGNOSIS — F51.01 PRIMARY INSOMNIA: Primary | ICD-10-CM

## 2024-08-26 DIAGNOSIS — F51.01 PRIMARY INSOMNIA: ICD-10-CM

## 2024-08-26 RX ORDER — ZOLPIDEM TARTRATE 5 MG/1
5 TABLET ORAL NIGHTLY
Qty: 30 TABLET | Refills: 1 | Status: SHIPPED | OUTPATIENT
Start: 2024-08-26 | End: 2024-08-27

## 2024-08-27 ENCOUNTER — TELEPHONE (OUTPATIENT)
Dept: PRIMARY CARE | Facility: CLINIC | Age: 84
End: 2024-08-27
Payer: MEDICARE

## 2024-08-27 DIAGNOSIS — F51.01 PRIMARY INSOMNIA: ICD-10-CM

## 2024-08-27 RX ORDER — ZOLPIDEM TARTRATE 5 MG/1
5 TABLET ORAL NIGHTLY
Qty: 30 TABLET | Refills: 1 | Status: SHIPPED | OUTPATIENT
Start: 2024-08-27 | End: 2024-08-27 | Stop reason: SDUPTHER

## 2024-08-27 RX ORDER — ZOLPIDEM TARTRATE 5 MG/1
5 TABLET ORAL NIGHTLY
Qty: 30 TABLET | Refills: 1 | Status: SHIPPED | OUTPATIENT
Start: 2024-08-27 | End: 2024-10-26

## 2024-08-27 NOTE — TELEPHONE ENCOUNTER
----- Message from Paula Burnette DO sent at 3/31/2023  7:59 AM CDT -----  Please notify the patient of needs 3 hour gtt, normal cbc for pregnancy    Phone called to pt and reviewed lab results with pt. She verbalized understanding to schedule 3 hour gtt. Instructions for testing reviewed. All questions answered.   Epic order placed for 3 hr gtt.    Day supply for missing for Zolpidem, pharmacy is requesting new script

## 2024-11-19 DIAGNOSIS — I10 HYPERTENSION, UNSPECIFIED TYPE: ICD-10-CM

## 2024-11-19 DIAGNOSIS — I10 PRIMARY HYPERTENSION: ICD-10-CM

## 2024-11-19 RX ORDER — APIXABAN 5 MG/1
5 TABLET, FILM COATED ORAL 2 TIMES DAILY
Qty: 180 TABLET | Refills: 0 | Status: SHIPPED | OUTPATIENT
Start: 2024-11-19

## 2024-11-19 RX ORDER — POTASSIUM CHLORIDE 750 MG/1
10 TABLET, EXTENDED RELEASE ORAL DAILY
Qty: 90 TABLET | Refills: 0 | Status: SHIPPED | OUTPATIENT
Start: 2024-11-19

## 2024-11-19 RX ORDER — AMLODIPINE BESYLATE 5 MG/1
5 TABLET ORAL DAILY
Qty: 90 TABLET | Refills: 0 | Status: SHIPPED | OUTPATIENT
Start: 2024-11-19

## 2024-12-12 DIAGNOSIS — F41.8 DEPRESSION WITH ANXIETY: ICD-10-CM

## 2024-12-13 RX ORDER — SERTRALINE HYDROCHLORIDE 50 MG/1
75 TABLET, FILM COATED ORAL DAILY
Qty: 135 TABLET | Refills: 0 | Status: SHIPPED | OUTPATIENT
Start: 2024-12-13

## 2024-12-14 DIAGNOSIS — F41.8 DEPRESSION WITH ANXIETY: ICD-10-CM

## 2024-12-16 RX ORDER — SERTRALINE HYDROCHLORIDE 50 MG/1
75 TABLET, FILM COATED ORAL DAILY
Qty: 135 TABLET | Refills: 0 | Status: SHIPPED | OUTPATIENT
Start: 2024-12-16

## 2025-02-17 ENCOUNTER — OFFICE VISIT (OUTPATIENT)
Dept: CARDIOLOGY | Facility: CLINIC | Age: 85
End: 2025-02-17
Payer: MEDICARE

## 2025-02-17 VITALS
OXYGEN SATURATION: 95 % | HEIGHT: 63 IN | SYSTOLIC BLOOD PRESSURE: 161 MMHG | WEIGHT: 159.5 LBS | HEART RATE: 62 BPM | BODY MASS INDEX: 28.26 KG/M2 | DIASTOLIC BLOOD PRESSURE: 88 MMHG

## 2025-02-17 DIAGNOSIS — I50.31 ACUTE DIASTOLIC HEART FAILURE: ICD-10-CM

## 2025-02-17 DIAGNOSIS — I50.810 RIGHT-SIDED HEART FAILURE, UNSPECIFIED HF CHRONICITY: Primary | ICD-10-CM

## 2025-02-17 PROCEDURE — 1126F AMNT PAIN NOTED NONE PRSNT: CPT | Performed by: INTERNAL MEDICINE

## 2025-02-17 PROCEDURE — 1159F MED LIST DOCD IN RCRD: CPT | Performed by: INTERNAL MEDICINE

## 2025-02-17 PROCEDURE — 99214 OFFICE O/P EST MOD 30 MIN: CPT | Performed by: INTERNAL MEDICINE

## 2025-02-17 PROCEDURE — 1036F TOBACCO NON-USER: CPT | Performed by: INTERNAL MEDICINE

## 2025-02-17 PROCEDURE — 3079F DIAST BP 80-89 MM HG: CPT | Performed by: INTERNAL MEDICINE

## 2025-02-17 PROCEDURE — 1160F RVW MEDS BY RX/DR IN RCRD: CPT | Performed by: INTERNAL MEDICINE

## 2025-02-17 PROCEDURE — 3077F SYST BP >= 140 MM HG: CPT | Performed by: INTERNAL MEDICINE

## 2025-02-17 PROCEDURE — G2211 COMPLEX E/M VISIT ADD ON: HCPCS | Performed by: INTERNAL MEDICINE

## 2025-02-17 RX ORDER — SPIRONOLACTONE 25 MG/1
25 TABLET ORAL DAILY
Qty: 90 TABLET | Refills: 3 | Status: SHIPPED | OUTPATIENT
Start: 2025-02-17 | End: 2026-02-17

## 2025-02-17 ASSESSMENT — ENCOUNTER SYMPTOMS
LOSS OF SENSATION IN FEET: 0
OCCASIONAL FEELINGS OF UNSTEADINESS: 0
DEPRESSION: 0

## 2025-02-17 ASSESSMENT — PAIN SCALES - GENERAL: PAINLEVEL_OUTOF10: 0-NO PAIN

## 2025-02-17 NOTE — PATIENT INSTRUCTIONS
Thank you for coming to your cardiology visit.  Your weight gain associated with lower extremity swelling is a sign of congestive heart failure.  Please continue to follow daily weights.  I would like to start you on 2 new medications, spironolactone 25 and Jardiance 10 mg.  Both of these should serve to help get rid of the fluid in a more gentle way than the Lasix and may have benefits in preventing long-term kidney injury from Lasix.  Please make sure you use good hygiene around the groin area to prevent possible UTIs and fungal yeast infections which can occur with Jardiance.  Please get follow-up blood test in 2 weeks and see me in about 6 weeks.  I suspect you should see some weight loss and decline in edema within for 5 days of starting these medications.  Please feel free to call me [phone] with any questions

## 2025-02-17 NOTE — PROGRESS NOTES
Primary Care Physician: Chris Henderson MD  Date of Visit: 02/17/2025 10:40 AM EST  Location of visit: [facility] [place] [address]     Chief Complaint:   No chief complaint on file.       HPI / Summary:   Sirisha Whyte is a 84 y.o. female presents for followup.     7/2019 TV ring 2/2 HF and atrial functional TR, svg-RCA   Echo 8/2023 ef 55%, mild TR reduce RVEF    6 mo followup   with lung and colon ca    Some edema, wt gain, hand tingling  Not more dyspneic, walking on treadmill  Up 9 pounds    Specialty Problems          Cardiology Problems    Heart palpitations    Carotid bruit    Moderate mitral regurgitation    Coronary artery disease involving autologous artery coronary bypass graft without angina pectoris     Formatting of this note might be different from the original. History: CAD Assessment: 7/31/2019: CABG x1 (GSV to RCA) Plan: ASA, BB, statin.         Non-rheumatic tricuspid valve insufficiency     Formatting of this note might be different from the original. History: Echo (7/10/2019): Severe (4+) tricuspid valve regurgitation caused by annular dilatation. Mild thickening. Assessment: 7/31/2019: TVr (30 CE) Plan: Daily ASA, diuresis.  Postop echo complete         Right ventricular dysfunction, NYHA class 3     Formatting of this note might be different from the original. History: Presented with ANDERSON & fatigue in the setting of severe TR. Prior to surgery RV gave the appearance of normal function. Used HCTZ to maintain volume balance. Assessment: Is now s/p CABG & TVr - post-op echo uncovered moderate RV systolic dysfx in the setting of a newly competent TV. Remains FVO with mild edema. Plan: Continue po lasix, aldactone, BB         Atrial fibrillation (Multi)    Hyperlipidemia    Hypertension    Restrictive cardiomyopathy (Multi)    Right heart failure    Severe tricuspid regurgitation    Sinus bradycardia    Tricuspid valve replaced        Past Medical History:   Diagnosis Date    History of radiation  "therapy     Unspecified benign mammary dysplasia of unspecified breast 11/18/2021    Atypical ductal hyperplasia of breast    Vitamin D deficiency, unspecified 04/21/2022    Vitamin D deficiency, unspecified          Past Surgical History:   Procedure Laterality Date    BREAST LUMPECTOMY Right     DILATION AND CURETTAGE OF UTERUS  11/18/2021    Dilation And Curettage Of Cervical Stump    ROTATOR CUFF REPAIR  11/18/2021    Rotator Cuff Repair    TUBAL LIGATION  11/18/2021    Tubal Ligation          Social History:   reports that she has never smoked. She has never used smokeless tobacco. She reports current alcohol use of about 3.0 standard drinks of alcohol per week. She reports that she does not use drugs.      Allergies:  Allergies   Allergen Reactions    Ace Inhibitors Angioedema and Swelling       Outpatient Medications:  Current Outpatient Medications   Medication Instructions    acetaminophen (TYLENOL) 650 mg, Every 4 hours PRN    amLODIPine (NORVASC) 5 mg, oral, Daily    atenolol (TENORMIN) 50 mg, oral, Daily    atorvastatin (LIPITOR) 20 mg, oral, Daily    cholecalciferol (Vitamin D-3) 50 mcg (2,000 unit) capsule Take 1 capsule (50 mcg) by mouth early in the morning..    Eliquis 5 mg, oral, 2 times daily    furosemide (Lasix) 20 mg tablet TAKE 1 TABLET DAILY AS NEEDED FOR WEIGHT GAIN MORE THAN 2 TO 3 POUNDS IN TWO DAYS    multivit with minerals/lutein (VISION PLUS LUTEIN ORAL) 1 capsule, Daily    polyethylene glycol (GLYCOLAX, MIRALAX) 17 g, Every 12 hours PRN    potassium chloride CR 10 mEq ER tablet 10 mEq, oral, Daily    sertraline (ZOLOFT) 75 mg, oral, Daily    wheat dextrin (BENEFIBER HEALTHY SHAPE ORAL) Take by mouth. Benefiber oral powder ---use as directed    zolpidem (AMBIEN) 5 mg, oral, Nightly       ROS     Physical Exam:  Vitals:    02/17/25 1037   BP: 161/88   BP Location: Left arm   Patient Position: Sitting   Pulse: 62   SpO2: 95%   Weight: 72.3 kg (159 lb 8 oz)   Height: 1.6 m (5' 3\")     Wt " Readings from Last 5 Encounters:   02/17/25 72.3 kg (159 lb 8 oz)   08/13/24 69.8 kg (153 lb 14.4 oz)   06/24/24 68 kg (150 lb)   05/20/24 66.7 kg (147 lb)   05/20/24 69.1 kg (152 lb 6.4 oz)     Body mass index is 28.25 kg/m².   Physical Exam   Neck veins appear full rhythm is regular no gallop no murmur.  Clear lungs.  Soft abdomen.  Trace ankle edema  Last Labs:  CMP:  Recent Labs     06/24/24  1112 04/30/24  0915 08/08/23  1320 11/21/22  1055 04/21/22  1110    139 140 137 139   K 4.4 4.3 4.6 4.7 4.1    102 100 102 100   CO2 26 28 30 27 30   ANIONGAP 15 13 15 13 13   BUN 17 17 14 21 15   CREATININE 0.97 0.83 0.99 1.02 0.88   EGFR 59* 71  --   --   --    GLUCOSE 87 110* 90 83 92     Recent Labs     06/24/24  1112 04/30/24  0915 08/08/23  1320 11/21/22  1055 04/21/22  1110   ALBUMIN 4.8 4.5 4.7 4.5 4.6   ALKPHOS 65 59 59 59 75   ALT 21 21 18 22 21   AST 21 24 22 23 24   BILITOT 0.6 0.5 0.7 0.7 0.7     CBC:  Recent Labs     04/30/24  0915 08/08/23  1320 11/21/22  1055 04/21/22  1110 05/06/21  1141   WBC 3.5* 4.2* 3.6* 4.0* 4.6   HGB 12.9 12.7 12.4 13.2 13.1   HCT 38.4 40.4 39.3 40.4 40.7    211 185 219 254   MCV 90 96 95 94 95     COAG:   Recent Labs     04/30/24  0915 08/22/19  1019 06/26/18  1506   INR 1.7* 1.5* 1.3*     HEME/ENDO:  Recent Labs     11/21/22  1055 10/13/20  1305   TSH 1.49 1.92      CARDIAC:   Recent Labs     04/30/24  1107 04/30/24  1000 04/30/24  0915   TROPHS <3 8 <3   BNP  --   --  153*     Recent Labs     06/24/24  1112 08/08/23  1320 11/21/22  1055 04/21/22  1110   CHOL 196 189 186 192   LDLF  --  77 80 76   .3 96.9 93.1 106.5   TRIG 81 75 63 50       Last Cardiology Tests:  ECG:      Echo:  Echo Results:  No results found for this or any previous visit from the past 3650 days.       Cath:      Stress Test:  Stress Results:  No results found for this or any previous visit from the past 365 days.         Cardiac Imaging:  BI mammo bilateral screening  tomosynthesis  Narrative: Interpreted By:  Ariadne Bhatia,   STUDY:  BI MAMMO BILATERAL SCREENING TOMOSYNTHESIS;  5/20/2024 12:08 pm      ACCESSION NUMBER(S):  [id number]      ORDERING CLINICIAN:  ELIZABETH CRAWFORD      INDICATION:  Screening. History of right lumpectomy and radiation.      COMPARISON:  05/08/2023, 04/11/2022.      FINDINGS:  2D and tomosynthesis images were reviewed at 1 mm slice thickness.      Density:  The breast tissue is heterogeneously dense, which may  obscure small masses.      Stable postoperative scarring is seen in the central lateral right  breast at posterior depth. No suspicious masses or calcifications are  identified.      Impression: No mammographic evidence of malignancy.      BI-RADS CATEGORY:      BI-RADS Category:  2 Benign.  Recommendation:  Annual Screening.  Recommended Date:  1 Year.  Laterality:  Bilateral.      For any future breast imaging appointments, please call [phone]).      MACRO:  None      Signed by: Ariadne Bhatia 5/20/2024 12:40 PM  Dictation workstation:   [id number]        Assessment/Plan       This very pleasant 84-year-old female with atrial functional TR and right heart failure status post tricuspid ring in 2019 is evidencing signs of volume overload I have recommended spironolactone 25 and Jardiance 10 daily weights and an RFP in 2 weeks I will see her back in 6 weeks.  Thank you for allow me to participate in her care    Orders:  No orders of the defined types were placed in this encounter.     Followup Appts:  Future Appointments   Date Time Provider Department Center   2/25/2025 10:15 AM Chris Henderson MD [id number] [facility]   5/22/2025  8:30 AM [facility] 6 [facility]   5/22/2025  9:30 AM Elizabeth Crawford, APRN-CNP [id number] [facility]           ____________________________________________________________  Fuentes Pedraza MD    Senior Attending Physician  [facility]  [facility]  [facility]

## 2025-02-25 ENCOUNTER — TELEPHONE (OUTPATIENT)
Dept: PRIMARY CARE | Facility: CLINIC | Age: 85
End: 2025-02-25

## 2025-02-25 ENCOUNTER — APPOINTMENT (OUTPATIENT)
Dept: PRIMARY CARE | Facility: CLINIC | Age: 85
End: 2025-02-25
Payer: MEDICARE

## 2025-02-25 VITALS
WEIGHT: 156 LBS | DIASTOLIC BLOOD PRESSURE: 80 MMHG | SYSTOLIC BLOOD PRESSURE: 130 MMHG | BODY MASS INDEX: 27.64 KG/M2 | HEART RATE: 69 BPM | HEIGHT: 63 IN

## 2025-02-25 DIAGNOSIS — F51.01 PRIMARY INSOMNIA: ICD-10-CM

## 2025-02-25 DIAGNOSIS — G25.0 BENIGN FAMILIAL TREMOR: ICD-10-CM

## 2025-02-25 DIAGNOSIS — E78.2 MIXED HYPERLIPIDEMIA: ICD-10-CM

## 2025-02-25 DIAGNOSIS — I10 PRIMARY HYPERTENSION: ICD-10-CM

## 2025-02-25 DIAGNOSIS — I48.20 CHRONIC ATRIAL FIBRILLATION (MULTI): Primary | ICD-10-CM

## 2025-02-25 DIAGNOSIS — F41.8 DEPRESSION WITH ANXIETY: ICD-10-CM

## 2025-02-25 PROCEDURE — G0439 PPPS, SUBSEQ VISIT: HCPCS | Performed by: FAMILY MEDICINE

## 2025-02-25 PROCEDURE — 99215 OFFICE O/P EST HI 40 MIN: CPT | Performed by: FAMILY MEDICINE

## 2025-02-25 PROCEDURE — 1170F FXNL STATUS ASSESSED: CPT | Performed by: FAMILY MEDICINE

## 2025-02-25 PROCEDURE — 3079F DIAST BP 80-89 MM HG: CPT | Performed by: FAMILY MEDICINE

## 2025-02-25 PROCEDURE — 1036F TOBACCO NON-USER: CPT | Performed by: FAMILY MEDICINE

## 2025-02-25 PROCEDURE — 1159F MED LIST DOCD IN RCRD: CPT | Performed by: FAMILY MEDICINE

## 2025-02-25 PROCEDURE — 3075F SYST BP GE 130 - 139MM HG: CPT | Performed by: FAMILY MEDICINE

## 2025-02-25 RX ORDER — PROPRANOLOL HYDROCHLORIDE 80 MG/1
80 CAPSULE, EXTENDED RELEASE ORAL DAILY
Qty: 30 CAPSULE | Refills: 11 | Status: SHIPPED | OUTPATIENT
Start: 2025-02-25 | End: 2026-02-25

## 2025-02-25 RX ORDER — ZOLPIDEM TARTRATE 5 MG/1
5 TABLET ORAL NIGHTLY
Qty: 30 TABLET | Refills: 1 | Status: SHIPPED | OUTPATIENT
Start: 2025-02-25 | End: 2025-02-28 | Stop reason: SDUPTHER

## 2025-02-25 RX ORDER — SERTRALINE HYDROCHLORIDE 50 MG/1
75 TABLET, FILM COATED ORAL DAILY
Qty: 135 TABLET | Refills: 0 | Status: CANCELLED | OUTPATIENT
Start: 2025-02-25

## 2025-02-25 ASSESSMENT — ENCOUNTER SYMPTOMS
GASTROINTESTINAL NEGATIVE: 1
OCCASIONAL FEELINGS OF UNSTEADINESS: 0
SLEEP DISTURBANCE: 1
CARDIOVASCULAR NEGATIVE: 1
LOSS OF SENSATION IN FEET: 0
DYSPHORIC MOOD: 1
RESPIRATORY NEGATIVE: 1
MUSCULOSKELETAL NEGATIVE: 1
NERVOUS/ANXIOUS: 1
DEPRESSION: 0
CONSTITUTIONAL NEGATIVE: 1
NEUROLOGICAL NEGATIVE: 1

## 2025-02-25 ASSESSMENT — ACTIVITIES OF DAILY LIVING (ADL)
DRESSING: INDEPENDENT
TAKING_MEDICATION: INDEPENDENT
GROCERY_SHOPPING: INDEPENDENT
MANAGING_FINANCES: INDEPENDENT
DOING_HOUSEWORK: INDEPENDENT
BATHING: INDEPENDENT

## 2025-02-25 NOTE — TELEPHONE ENCOUNTER
Sertraline and zolpidem interferes with each other, pharmacy also states script for Ambien is missing day supply and new script is needed.

## 2025-02-25 NOTE — PROGRESS NOTES
Subjective   Patient ID: Sirisha Whyte is a 84 y.o. female who presents for Medicare Annual Wellness Visit Subsequent.    HPI pt comes in for fu on anxiety depression which is not ideallly cont, cardeiomyopathy now on jardiance and aldactone, benign essential tremor, afib, , insomnia cont on zolpidem 5 mg    Review of Systems   Constitutional: Negative.    HENT: Negative.     Respiratory: Negative.     Cardiovascular: Negative.    Gastrointestinal: Negative.    Musculoskeletal: Negative.    Neurological: Negative.    Psychiatric/Behavioral:  Positive for dysphoric mood and sleep disturbance. The patient is nervous/anxious.         No suicidal or homicidal ideation   Still in grief over loss of son and [facility]      OARRS:  Chris Henderson MD on 2/25/2025 11:43 AM  I have personally reviewed the OARRS report for Sirisha Whyte. I have considered the risks of abuse, dependence, addiction and diversion and I believe that it is clinically appropriate for Sirisha Whyte to be prescribed this medication    Is the patient prescribed a combination of a benzodiazepine and opioid?  No    Last Urine Drug Screen / ordered today: Yes  Recent Results (from the past 8760 hours)   Drug Screen, Urine With Reflex to Confirmation    Collection Time: 06/24/24 11:25 AM   Result Value Ref Range    Amphetamine Screen, Urine Presumptive Negative Presumptive Negative    Barbiturate Screen, Urine Presumptive Negative Presumptive Negative    Benzodiazepines Screen, Urine Presumptive Negative Presumptive Negative    Cannabinoid Screen, Urine Presumptive Negative Presumptive Negative    Cocaine Metabolite Screen, Urine Presumptive Negative Presumptive Negative    Fentanyl Screen, Urine Presumptive Negative Presumptive Negative    Opiate Screen, Urine Presumptive Negative Presumptive Negative    Oxycodone Screen, Urine Presumptive Negative Presumptive Negative    PCP Screen, Urine Presumptive Negative Presumptive Negative     "Methadone Screen, Urine Presumptive Negative Presumptive Negative     Results are as expected.         Controlled Substance Agreement:  Date of the Last Agreement: 6/24/24  Reviewed Controlled Substance Agreement including but not limited to the benefits, risks, and alternatives to treatment with a Controlled Substance medication(s).    Sleep Aids:   What is the patient's goal of therapy? Isnomnia   Is this being achieved with current treatment? yes    Activities of Daily Living:   Is your overall impression that this patient is benefiting (symptom reduction outweighs side effects) from sleep aid therapy? Yes     1. Physical Functioning: Better  2. Family Relationship: Better  3. Social Relationship: Better  4. Mood: Better  5. Sleep Patterns: Better  6. Overall Function: Better    Objective   /80   Pulse 69   Ht 1.6 m (5' 3\")   Wt 70.8 kg (156 lb)   LMP  (LMP Unknown)   BMI 27.63 kg/m²     Physical Exam  Vitals reviewed.   Constitutional:       Appearance: Normal appearance. She is normal weight.   Eyes:      Extraocular Movements: Extraocular movements intact.      Conjunctiva/sclera: Conjunctivae normal.      Pupils: Pupils are equal, round, and reactive to light.   Cardiovascular:      Rate and Rhythm: Normal rate and regular rhythm.      Pulses: Normal pulses.      Heart sounds: Normal heart sounds.   Pulmonary:      Effort: Pulmonary effort is normal.      Breath sounds: Normal breath sounds.   Abdominal:      General: Bowel sounds are normal.      Palpations: Abdomen is soft.   Musculoskeletal:         General: Normal range of motion.   Skin:     General: Skin is warm and dry.   Neurological:      General: No focal deficit present.      Mental Status: She is alert and oriented to person, place, and time. Mental status is at baseline.     Sig benign essentila tremor left hand which is her dominent hand, no cogwheel reigidity would like to try med    Assessment/Plan   Problem List Items Addressed " This Visit             ICD-10-CM    Atrial fibrillation (Multi) - Primary I48.91    Relevant Medications    propranolol LA (Inderal LA) 80 mg 24 hr capsule    Other Relevant Orders    CBC and Auto Differential    Benign familial tremor G25.0    Relevant Medications    propranolol LA (Inderal LA) 80 mg 24 hr capsule    Hyperlipidemia E78.5    Relevant Orders    Lipid Panel    Hypertension I10    Relevant Medications    propranolol LA (Inderal LA) 80 mg 24 hr capsule    Other Relevant Orders    Comprehensive Metabolic Panel    Insomnia G47.00                   Relevant Medications    zolpidem (Ambien) 5 mg tablet     Other Visit Diagnoses         Codes    Depression with anxiety     F41.8          Anxiety pt not well cont andd will change sertraline from 75 to 100mg and see how pt responds over next month  Insomnia well cont and pt compliant w regimen and responing to med will cont   Essential tremor left hand worsening and pt would like to try med   In attemt not to add to pt med total will do a 1month trial of change from atenolol 50 to propranolol LA 80 which are bp equivilant and rate cont similar but propranolol may creaste better tremor cont , if bp goes higher or pt has sig fatique will stop , pt will make cardio aware  Cardiomyopathy pt started on jardiance 10 and spironolactone by cardiologist,  her jardiance is costing 600 a month which she can't maintain, as her ef in 23 was close to 55percent the benefit gained may not match the cost that she is incurring, will discuss w ccardio  As for spironolactone will check potassium as if she tolerates the aldactone may be able to stop kdur that she had needed w furosemide, results of bmp and lipid will be available for her whne she sees cardio next month  Spent 50 min in totoal pt exam time and disucssion and med adjustment

## 2025-02-25 NOTE — PROGRESS NOTES
Subjective   Reason for Visit: Sirisha Whyte is an 84 y.o. female here for a Medicare Wellness visit.      Pt comes in for wellness exam. Pt has no concerns today. Pt saw her cardiologist last week.          HPI    Patient Care Team:  Chris Henderson MD as PCP - General (Family Medicine)  Chris Henderson MD as PCP [facility]     Review of Systems    Objective   Vitals:  LMP  (LMP Unknown)       Physical Exam    Assessment & Plan  Primary insomnia         Depression with anxiety

## 2025-02-26 ENCOUNTER — TELEPHONE (OUTPATIENT)
Dept: PRIMARY CARE | Facility: CLINIC | Age: 85
End: 2025-02-26
Payer: MEDICARE

## 2025-02-26 LAB
ALBUMIN SERPL-MCNC: 4.9 G/DL (ref 3.6–5.1)
ALP SERPL-CCNC: 57 U/L (ref 37–153)
ALT SERPL-CCNC: 23 U/L (ref 6–29)
ANION GAP SERPL CALCULATED.4IONS-SCNC: 12 MMOL/L (CALC) (ref 7–17)
AST SERPL-CCNC: 19 U/L (ref 10–35)
BASOPHILS # BLD AUTO: 42 CELLS/UL (ref 0–200)
BASOPHILS NFR BLD AUTO: 1.1 %
BILIRUB SERPL-MCNC: 0.8 MG/DL (ref 0.2–1.2)
BUN SERPL-MCNC: 16 MG/DL (ref 7–25)
CALCIUM SERPL-MCNC: 10.1 MG/DL (ref 8.6–10.4)
CHLORIDE SERPL-SCNC: 103 MMOL/L (ref 98–110)
CHOLEST SERPL-MCNC: 241 MG/DL
CHOLEST/HDLC SERPL: 2.6 (CALC)
CO2 SERPL-SCNC: 25 MMOL/L (ref 20–32)
CREAT SERPL-MCNC: 0.88 MG/DL (ref 0.6–0.95)
EGFRCR SERPLBLD CKD-EPI 2021: 66 ML/MIN/1.73M2
EOSINOPHIL # BLD AUTO: 110 CELLS/UL (ref 15–500)
EOSINOPHIL NFR BLD AUTO: 2.9 %
ERYTHROCYTE [DISTWIDTH] IN BLOOD BY AUTOMATED COUNT: 13 % (ref 11–15)
GLUCOSE SERPL-MCNC: 108 MG/DL (ref 65–99)
HCT VFR BLD AUTO: 39.3 % (ref 35–45)
HDLC SERPL-MCNC: 93 MG/DL
HGB BLD-MCNC: 12.8 G/DL (ref 11.7–15.5)
LDLC SERPL CALC-MCNC: 130 MG/DL (CALC)
LYMPHOCYTES # BLD AUTO: 1148 CELLS/UL (ref 850–3900)
LYMPHOCYTES NFR BLD AUTO: 30.2 %
MCH RBC QN AUTO: 30.2 PG (ref 27–33)
MCHC RBC AUTO-ENTMCNC: 32.6 G/DL (ref 32–36)
MCV RBC AUTO: 92.7 FL (ref 80–100)
MONOCYTES # BLD AUTO: 312 CELLS/UL (ref 200–950)
MONOCYTES NFR BLD AUTO: 8.2 %
NEUTROPHILS # BLD AUTO: 2189 CELLS/UL (ref 1500–7800)
NEUTROPHILS NFR BLD AUTO: 57.6 %
NONHDLC SERPL-MCNC: 148 MG/DL (CALC)
PLATELET # BLD AUTO: 221 THOUSAND/UL (ref 140–400)
PMV BLD REES-ECKER: 11.1 FL (ref 7.5–12.5)
POTASSIUM SERPL-SCNC: 4.8 MMOL/L (ref 3.5–5.3)
PROT SERPL-MCNC: 7.6 G/DL (ref 6.1–8.1)
RBC # BLD AUTO: 4.24 MILLION/UL (ref 3.8–5.1)
SODIUM SERPL-SCNC: 140 MMOL/L (ref 135–146)
TRIGL SERPL-MCNC: 83 MG/DL
WBC # BLD AUTO: 3.8 THOUSAND/UL (ref 3.8–10.8)

## 2025-02-26 NOTE — TELEPHONE ENCOUNTER
----- Message from Abimael Johnson sent at 2/26/2025 12:29 PM EST -----    ----- Message -----  From: Chris Henderson MD  Sent: 2/26/2025  10:48 AM EST  To: Abimael Johnson; Artemio Bates MA    All results are stable  no change

## 2025-02-27 DIAGNOSIS — I10 PRIMARY HYPERTENSION: ICD-10-CM

## 2025-02-28 DIAGNOSIS — F51.01 PRIMARY INSOMNIA: ICD-10-CM

## 2025-02-28 RX ORDER — ZOLPIDEM TARTRATE 5 MG/1
5 TABLET ORAL NIGHTLY
Qty: 30 TABLET | Refills: 1 | Status: SHIPPED | OUTPATIENT
Start: 2025-02-28 | End: 2025-04-29

## 2025-03-03 DIAGNOSIS — I10 HYPERTENSION, UNSPECIFIED TYPE: ICD-10-CM

## 2025-03-03 RX ORDER — POTASSIUM CHLORIDE 750 MG/1
10 TABLET, FILM COATED, EXTENDED RELEASE ORAL DAILY
Qty: 90 TABLET | Refills: 3 | Status: SHIPPED | OUTPATIENT
Start: 2025-03-03

## 2025-03-28 ENCOUNTER — APPOINTMENT (OUTPATIENT)
Dept: CARDIOLOGY | Facility: CLINIC | Age: 85
End: 2025-03-28
Payer: MEDICARE

## 2025-03-31 DIAGNOSIS — I10 PRIMARY HYPERTENSION: ICD-10-CM

## 2025-03-31 DIAGNOSIS — F43.20 GRIEF REACTION: ICD-10-CM

## 2025-03-31 RX ORDER — AMLODIPINE BESYLATE 5 MG/1
5 TABLET ORAL DAILY
Qty: 90 TABLET | Refills: 0 | Status: SHIPPED | OUTPATIENT
Start: 2025-03-31

## 2025-03-31 RX ORDER — ATENOLOL 50 MG/1
50 TABLET ORAL DAILY
Qty: 90 TABLET | Refills: 0 | Status: SHIPPED | OUTPATIENT
Start: 2025-03-31

## 2025-05-22 ENCOUNTER — APPOINTMENT (OUTPATIENT)
Dept: RADIOLOGY | Facility: CLINIC | Age: 85
End: 2025-05-22
Payer: MEDICARE

## 2025-05-22 ENCOUNTER — APPOINTMENT (OUTPATIENT)
Dept: SURGICAL ONCOLOGY | Facility: CLINIC | Age: 85
End: 2025-05-22
Payer: MEDICARE

## 2025-05-27 NOTE — PROGRESS NOTES
Sirisha Whyte female   1940 84 y.o.   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 2021 Elma Keith performed right breast Magseed localized lumpectomy. Final pathology revealed right breast DCIS, intermediate to high grade, margins negative. 2021 she completed post-operative radiation under the direction of Christina Wilson. She met with Camilla Arcos to discuss endocrine therapy, but declined at that time.      She has personal history of right breast core biopsy demonstrating atypical ductal hyperplasia (ADH) in 2012. 2012, Basia Mandel performed right breast excisional biopsy demonstrating flat epithelial atypia (FEA) and focal ADH. She states she did not follow up with a high risk provider. She has no family history of breast cancer.     She presents today for annual mammogram and exam. She denies any new masses or lumps.      BREAST IMAGIN2024 Bilateral screening mammogram, indicates BI-RADS Category 2.      FEMALE HISTORY: menarche age 14, , first birth age 27,  x 3 months, OCP's x 20 years, natural menopause age 48, no HRT, heterogeneously dense tissue     FAMILY CANCER HISTORY:  None      Surgical History  She has a past surgical history that includes Dilation and curettage of uterus (2021); Rotator cuff repair (2021); Tubal ligation (2021); and Breast lumpectomy (Right).     Social History  She reports that she has never smoked. She has never used smokeless tobacco. She reports current alcohol use of about 3.0 standard drinks of alcohol per week. She reports that she does not use drugs.    Family History  Family History   Problem Relation Name Age of Onset    Hypertension Mother          Allergies  Ace  inhibitors    Medications  Current Outpatient Medications   Medication Instructions    acetaminophen (TYLENOL) 650 mg, Every 4 hours PRN    amLODIPine (NORVASC) 5 mg, oral, Daily    apixaban (ELIQUIS) 5 mg, oral, 2 times daily    atenolol (TENORMIN) 50 mg, oral, Daily    atorvastatin (LIPITOR) 20 mg, oral, Daily    cholecalciferol (Vitamin D-3) 50 mcg (2,000 unit) capsule Take 1 capsule (50 mcg) by mouth early in the morning..    empagliflozin (JARDIANCE) 10 mg, oral, Daily    furosemide (Lasix) 20 mg tablet TAKE 1 TABLET DAILY AS NEEDED FOR WEIGHT GAIN MORE THAN 2 TO 3 POUNDS IN TWO DAYS    multivit with minerals/lutein (VISION PLUS LUTEIN ORAL) 1 capsule, Daily    polyethylene glycol (GLYCOLAX, MIRALAX) 17 g, Every 12 hours PRN    potassium chloride CR 10 mEq ER tablet 10 mEq, oral, Daily    propranolol LA (INDERAL LA) 80 mg, oral, Daily, Do not crush, chew, or split.    sertraline (ZOLOFT) 75 mg, oral, Daily    spironolactone (ALDACTONE) 25 mg, oral, Daily    wheat dextrin (BENEFIBER HEALTHY SHAPE ORAL) Take by mouth. Benefiber oral powder ---use as directed    zolpidem (AMBIEN) 5 mg, oral, Nightly         REVIEW OF SYSTEMS    Constitutional:  Negative for appetite change, fatigue, fever and unexpected weight change.   HENT:  Negative for ear pain, hearing loss, nosebleeds, sore throat and trouble swallowing.    Eyes:  Negative for discharge, itching and visual disturbance.   Respiratory:  Negative for cough, chest tightness and shortness of breath.    Cardiovascular:  Negative for chest pain, palpitations and leg swelling.   Breast: as indicated in HPI  Gastrointestinal:  Negative for abdominal pain, constipation, diarrhea and nausea.   Endocrine: Negative for cold intolerance and heat intolerance.   Genitourinary:  Negative for dysuria, frequency, hematuria, pelvic pain and vaginal bleeding.   Musculoskeletal:  Negative for arthralgias, back pain, gait problem, joint swelling and myalgias.   Skin:  Negative for  color change and rash.   Allergic/Immunologic: Negative for environmental allergies and food allergies.   Neurological:  Negative for dizziness, tremors, speech difficulty, weakness, numbness and headaches.   Hematological:  Does not bruise/bleed easily.   Psychiatric/Behavioral:  Negative for agitation, dysphoric mood and sleep disturbance. The patient is not nervous/anxious.         Past Medical History  She has a past medical history of History of radiation therapy, Unspecified benign mammary dysplasia of unspecified breast (11/18/2021), and Vitamin D deficiency, unspecified (04/21/2022).     Physical Exam  Patient is alert and oriented x3 and in a relaxed and appropriate mood. Her gait is steady and hand grasps are equal. Sclera is clear. The breasts are nearly symmetrical. The tissue is soft without palpable abnormalities, discrete nodules or masses. The right breast, superolateral quadrant has a well-healed excisional biopsy incision with tissue divot. The right breast has a well-healed very vague partial mastectomy incision, central lateral quadrant. The nipples appear normal. The left breast has aging seborrheic keratosis at inframammary fold. The right nipple areolar complex has mild skin thickening secondary to post-operative radiation. There is no cervical, supraclavicular or axillary lymphadenopathy. Heart rate and rhythm normal, S1 and S2 appreciated. The lungs are clear to auscultation bilaterally. There is well-healed vertical incision mid chest from previous open heart surgery.       Physical Exam  Chest:              Last Recorded Vitals  Vitals:    06/09/25 1028   BP: 145/79   Pulse: 73   Temp: 34.7 °C (94.5 °F)   SpO2: 94%         Relevant Results   Time was spent viewing digital images of the radiology testing with the patient, results pending    Assessment/Plan   Normal clinical exam, screening mammogram, history of right DCIS, history of right breast excisional biopsy, ADH and FEA, no family  history of breast cancer, heterogeneously dense tissue     Plan: Return in one year for bilateral screening mammogram and office visit. Discussed continuing annual mammograms until at least 6/2026 (five years post diagnosis). Will discuss discharge to PCP at visit in June 2026.    Patient Discussion/Summary  Your clinical examination is normal. Please return in one year for bilateral screening mammogram and office visit or sooner if you have any problems or concerns.     You can see your health information, review clinical summaries from office visits & test results online when you follow your health with MY  Chart, a personal health record. To sign up go to [email or web address]. If you need assistance with signing up or trouble getting into your account call [employer] Patient Line 24/7 at [phone].    My office phone number is [phone] if you need to get in touch with me or have additional questions or concerns. Thank you for choosing [facility] and trusting me as your healthcare provider. I look forward to seeing you again at your next office visit. I am honored to be a provider on your health care team and I remain dedicated to helping you achieve your health goals.      Linda Crawford, APRN-CNP

## 2025-06-02 DIAGNOSIS — E78.2 MIXED HYPERLIPIDEMIA: ICD-10-CM

## 2025-06-03 DIAGNOSIS — F41.8 DEPRESSION WITH ANXIETY: ICD-10-CM

## 2025-06-03 RX ORDER — SERTRALINE HYDROCHLORIDE 50 MG/1
75 TABLET, FILM COATED ORAL DAILY
Qty: 135 TABLET | Refills: 0 | Status: SHIPPED | OUTPATIENT
Start: 2025-06-03

## 2025-06-03 NOTE — TELEPHONE ENCOUNTER
R[employer] from [employer] called advising that there is a interaction with the Zoloft and Ambien.  OK for patient to continue both?

## 2025-06-04 RX ORDER — ATORVASTATIN CALCIUM 20 MG/1
20 TABLET, FILM COATED ORAL DAILY
Qty: 90 TABLET | Refills: 0 | Status: SHIPPED | OUTPATIENT
Start: 2025-06-04

## 2025-06-09 ENCOUNTER — HOSPITAL ENCOUNTER (OUTPATIENT)
Dept: RADIOLOGY | Facility: CLINIC | Age: 85
Discharge: HOME | End: 2025-06-09
Payer: MEDICARE

## 2025-06-09 ENCOUNTER — OFFICE VISIT (OUTPATIENT)
Dept: SURGICAL ONCOLOGY | Facility: CLINIC | Age: 85
End: 2025-06-09
Payer: MEDICARE

## 2025-06-09 VITALS
WEIGHT: 160.4 LBS | HEART RATE: 73 BPM | OXYGEN SATURATION: 94 % | BODY MASS INDEX: 28.41 KG/M2 | TEMPERATURE: 94.5 F | SYSTOLIC BLOOD PRESSURE: 145 MMHG | DIASTOLIC BLOOD PRESSURE: 79 MMHG

## 2025-06-09 DIAGNOSIS — Z85.3 ENCOUNTER FOR FOLLOW-UP SURVEILLANCE OF BREAST CANCER: ICD-10-CM

## 2025-06-09 DIAGNOSIS — Z12.31 ENCOUNTER FOR SCREENING MAMMOGRAM FOR MALIGNANT NEOPLASM OF BREAST: ICD-10-CM

## 2025-06-09 DIAGNOSIS — R92.30 DENSE BREAST TISSUE: Primary | ICD-10-CM

## 2025-06-09 DIAGNOSIS — Z08 ENCOUNTER FOR FOLLOW-UP SURVEILLANCE OF BREAST CANCER: ICD-10-CM

## 2025-06-09 PROCEDURE — 1126F AMNT PAIN NOTED NONE PRSNT: CPT | Performed by: NURSE PRACTITIONER

## 2025-06-09 PROCEDURE — 77067 SCR MAMMO BI INCL CAD: CPT | Performed by: STUDENT IN AN ORGANIZED HEALTH CARE EDUCATION/TRAINING PROGRAM

## 2025-06-09 PROCEDURE — 3077F SYST BP >= 140 MM HG: CPT | Performed by: NURSE PRACTITIONER

## 2025-06-09 PROCEDURE — 1159F MED LIST DOCD IN RCRD: CPT | Performed by: NURSE PRACTITIONER

## 2025-06-09 PROCEDURE — 3078F DIAST BP <80 MM HG: CPT | Performed by: NURSE PRACTITIONER

## 2025-06-09 PROCEDURE — 99213 OFFICE O/P EST LOW 20 MIN: CPT | Performed by: NURSE PRACTITIONER

## 2025-06-09 PROCEDURE — 77067 SCR MAMMO BI INCL CAD: CPT

## 2025-06-09 PROCEDURE — 1036F TOBACCO NON-USER: CPT | Performed by: NURSE PRACTITIONER

## 2025-06-09 PROCEDURE — 77063 BREAST TOMOSYNTHESIS BI: CPT | Performed by: STUDENT IN AN ORGANIZED HEALTH CARE EDUCATION/TRAINING PROGRAM

## 2025-06-09 ASSESSMENT — PAIN SCALES - GENERAL: PAINLEVEL_OUTOF10: 0-NO PAIN

## 2025-06-09 NOTE — PATIENT INSTRUCTIONS
Your clinical examination is normal. Please return in one year for bilateral screening mammogram and office visit or sooner if you have any problems or concerns.     You can see your health information, review clinical summaries from office visits & test results online when you follow your health with MY  Chart, a personal health record. To sign up go to [email or web address]. If you need assistance with signing up or trouble getting into your account call [employer] Patient Line 24/7 at [phone].    My office phone number is [phone] if you need to get in touch with me or have additional questions or concerns. Thank you for choosing [facility] and trusting me as your healthcare provider. I look forward to seeing you again at your next office visit. I am honored to be a provider on your health care team and I remain dedicated to helping you achieve your health goals.

## 2025-08-08 DIAGNOSIS — F41.8 DEPRESSION WITH ANXIETY: ICD-10-CM

## 2025-08-08 RX ORDER — SERTRALINE HYDROCHLORIDE 50 MG/1
75 TABLET, FILM COATED ORAL DAILY
Qty: 135 TABLET | Refills: 0 | Status: SHIPPED | OUTPATIENT
Start: 2025-08-08

## 2025-08-19 DIAGNOSIS — F41.8 DEPRESSION WITH ANXIETY: ICD-10-CM

## 2025-08-19 RX ORDER — SERTRALINE HYDROCHLORIDE 50 MG/1
75 TABLET, FILM COATED ORAL DAILY
Qty: 135 TABLET | Refills: 0 | Status: SHIPPED | OUTPATIENT
Start: 2025-08-19

## 2025-08-28 ENCOUNTER — APPOINTMENT (OUTPATIENT)
Dept: PRIMARY CARE | Facility: CLINIC | Age: 85
End: 2025-08-28
Payer: MEDICARE

## 2025-08-28 VITALS
BODY MASS INDEX: 27.46 KG/M2 | DIASTOLIC BLOOD PRESSURE: 82 MMHG | WEIGHT: 155 LBS | HEART RATE: 85 BPM | HEIGHT: 63 IN | SYSTOLIC BLOOD PRESSURE: 138 MMHG

## 2025-08-28 DIAGNOSIS — I25.83 CORONARY ARTERY DISEASE DUE TO LIPID RICH PLAQUE: ICD-10-CM

## 2025-08-28 DIAGNOSIS — F41.8 DEPRESSION WITH ANXIETY: ICD-10-CM

## 2025-08-28 DIAGNOSIS — E78.2 MIXED HYPERLIPIDEMIA: ICD-10-CM

## 2025-08-28 DIAGNOSIS — Z79.899 CONTROLLED SUBSTANCE AGREEMENT SIGNED: Primary | ICD-10-CM

## 2025-08-28 DIAGNOSIS — Z95.4 TRICUSPID VALVE REPLACED: ICD-10-CM

## 2025-08-28 DIAGNOSIS — I50.812 CHRONIC RIGHT-SIDED HEART FAILURE: ICD-10-CM

## 2025-08-28 DIAGNOSIS — I25.10 CORONARY ARTERY DISEASE DUE TO LIPID RICH PLAQUE: ICD-10-CM

## 2025-08-28 DIAGNOSIS — I10 PRIMARY HYPERTENSION: ICD-10-CM

## 2025-08-28 DIAGNOSIS — F51.01 PRIMARY INSOMNIA: ICD-10-CM

## 2025-08-28 PROCEDURE — 1159F MED LIST DOCD IN RCRD: CPT | Performed by: FAMILY MEDICINE

## 2025-08-28 PROCEDURE — 1036F TOBACCO NON-USER: CPT | Performed by: FAMILY MEDICINE

## 2025-08-28 PROCEDURE — 3075F SYST BP GE 130 - 139MM HG: CPT | Performed by: FAMILY MEDICINE

## 2025-08-28 PROCEDURE — 3079F DIAST BP 80-89 MM HG: CPT | Performed by: FAMILY MEDICINE

## 2025-08-28 PROCEDURE — 99214 OFFICE O/P EST MOD 30 MIN: CPT | Performed by: FAMILY MEDICINE

## 2025-08-28 PROCEDURE — G2211 COMPLEX E/M VISIT ADD ON: HCPCS | Performed by: FAMILY MEDICINE

## 2025-08-28 PROCEDURE — 1124F ACP DISCUSS-NO DSCNMKR DOCD: CPT | Performed by: FAMILY MEDICINE

## 2025-08-28 RX ORDER — SERTRALINE HYDROCHLORIDE 50 MG/1
75 TABLET, FILM COATED ORAL DAILY
Qty: 135 TABLET | Refills: 0 | Status: SHIPPED | OUTPATIENT
Start: 2025-08-28

## 2025-08-28 RX ORDER — SERTRALINE HYDROCHLORIDE 50 MG/1
75 TABLET, FILM COATED ORAL DAILY
Qty: 135 TABLET | Refills: 0 | Status: SHIPPED | OUTPATIENT
Start: 2025-08-28 | End: 2025-08-28

## 2025-08-28 RX ORDER — ZOLPIDEM TARTRATE 5 MG/1
5 TABLET ORAL NIGHTLY
Qty: 30 TABLET | Refills: 0 | Status: SHIPPED | OUTPATIENT
Start: 2025-08-28 | End: 2025-10-27

## 2025-08-28 ASSESSMENT — PATIENT HEALTH QUESTIONNAIRE - PHQ9
1. LITTLE INTEREST OR PLEASURE IN DOING THINGS: NOT AT ALL
SUM OF ALL RESPONSES TO PHQ9 QUESTIONS 1 AND 2: 0
2. FEELING DOWN, DEPRESSED OR HOPELESS: NOT AT ALL

## 2025-08-28 ASSESSMENT — ENCOUNTER SYMPTOMS
OCCASIONAL FEELINGS OF UNSTEADINESS: 0
CARDIOVASCULAR NEGATIVE: 1
LOSS OF SENSATION IN FEET: 0
DYSPHORIC MOOD: 1
GASTROINTESTINAL NEGATIVE: 1
MUSCULOSKELETAL NEGATIVE: 1
DEPRESSION: 0
NEUROLOGICAL NEGATIVE: 1
CONSTITUTIONAL NEGATIVE: 1
RESPIRATORY NEGATIVE: 1

## 2025-08-29 LAB
ALBUMIN SERPL-MCNC: 4.5 G/DL (ref 3.6–5.1)
ALP SERPL-CCNC: 59 U/L (ref 37–153)
ALT SERPL-CCNC: 29 U/L (ref 6–29)
AMPHETAMINES UR QL: NEGATIVE NG/ML
ANION GAP SERPL CALCULATED.4IONS-SCNC: 10 MMOL/L (CALC) (ref 7–17)
AST SERPL-CCNC: 26 U/L (ref 10–35)
BARBITURATES UR QL: NEGATIVE NG/ML
BENZODIAZ UR QL: NEGATIVE NG/ML
BILIRUB SERPL-MCNC: 0.7 MG/DL (ref 0.2–1.2)
BUN SERPL-MCNC: 20 MG/DL (ref 7–25)
BZE UR QL: NEGATIVE NG/ML
CALCIUM SERPL-MCNC: 9.4 MG/DL (ref 8.6–10.4)
CHLORIDE SERPL-SCNC: 102 MMOL/L (ref 98–110)
CHOLEST SERPL-MCNC: 194 MG/DL
CHOLEST/HDLC SERPL: 2 (CALC)
CO2 SERPL-SCNC: 26 MMOL/L (ref 20–32)
CREAT SERPL-MCNC: 0.82 MG/DL (ref 0.6–0.95)
CREAT UR-MCNC: 25.8 MG/DL
EGFRCR SERPLBLD CKD-EPI 2021: 71 ML/MIN/1.73M2
FENTANYL UR QL SCN: NEGATIVE NG/ML
GLUCOSE SERPL-MCNC: 85 MG/DL (ref 65–99)
HDLC SERPL-MCNC: 95 MG/DL
LDLC SERPL CALC-MCNC: 85 MG/DL (CALC)
METHADONE UR QL: NEGATIVE NG/ML
NONHDLC SERPL-MCNC: 99 MG/DL (CALC)
OPIATES UR QL: NEGATIVE NG/ML
OXIDANTS UR QL: NEGATIVE MCG/ML
OXYCODONE UR QL: NEGATIVE NG/ML
PCP UR QL: NEGATIVE NG/ML
PH UR: 5.7 [PH] (ref 4.5–9)
POTASSIUM SERPL-SCNC: 4.1 MMOL/L (ref 3.5–5.3)
PROT SERPL-MCNC: 7.5 G/DL (ref 6.1–8.1)
QUEST NOTES AND COMMENTS: NORMAL
SODIUM SERPL-SCNC: 138 MMOL/L (ref 135–146)
THC UR QL: NEGATIVE NG/ML
TRIGL SERPL-MCNC: 68 MG/DL

## 2025-09-06 ENCOUNTER — HOSPITAL ENCOUNTER (INPATIENT)
Facility: HOSPITAL | Age: 85
End: 2025-09-06
Attending: EMERGENCY MEDICINE | Admitting: STUDENT IN AN ORGANIZED HEALTH CARE EDUCATION/TRAINING PROGRAM
Payer: MEDICARE

## 2025-09-06 DIAGNOSIS — I48.91 ATRIAL FIBRILLATION WITH RVR (MULTI): Primary | ICD-10-CM

## 2025-09-06 LAB
ALBUMIN SERPL BCP-MCNC: 4.5 G/DL (ref 3.4–5)
ALP SERPL-CCNC: 58 U/L (ref 33–136)
ALT SERPL W P-5'-P-CCNC: 22 U/L (ref 7–45)
ANION GAP SERPL CALC-SCNC: 15 MMOL/L (ref 10–20)
AST SERPL W P-5'-P-CCNC: 25 U/L (ref 9–39)
BASOPHILS # BLD AUTO: 0.04 X10*3/UL (ref 0–0.1)
BASOPHILS NFR BLD AUTO: 0.8 %
BILIRUB DIRECT SERPL-MCNC: 0.1 MG/DL (ref 0–0.3)
BILIRUB SERPL-MCNC: 0.7 MG/DL (ref 0–1.2)
BNP SERPL-MCNC: 84 PG/ML (ref 0–99)
BUN SERPL-MCNC: 15 MG/DL (ref 6–23)
CALCIUM SERPL-MCNC: 9.7 MG/DL (ref 8.6–10.3)
CARDIAC TROPONIN I PNL SERPL HS: 7 NG/L (ref 0–13)
CARDIAC TROPONIN I PNL SERPL HS: 9 NG/L (ref 0–13)
CHLORIDE SERPL-SCNC: 101 MMOL/L (ref 98–107)
CO2 SERPL-SCNC: 24 MMOL/L (ref 21–32)
CREAT SERPL-MCNC: 0.92 MG/DL (ref 0.5–1.05)
EGFRCR SERPLBLD CKD-EPI 2021: 62 ML/MIN/1.73M*2
EOSINOPHIL # BLD AUTO: 0.06 X10*3/UL (ref 0–0.4)
EOSINOPHIL NFR BLD AUTO: 1.2 %
ERYTHROCYTE [DISTWIDTH] IN BLOOD BY AUTOMATED COUNT: 13.9 % (ref 11.5–14.5)
GLUCOSE SERPL-MCNC: 81 MG/DL (ref 74–99)
HCT VFR BLD AUTO: 41.5 % (ref 36–46)
HGB BLD-MCNC: 13.8 G/DL (ref 12–16)
IMM GRANULOCYTES # BLD AUTO: 0.02 X10*3/UL (ref 0–0.5)
IMM GRANULOCYTES NFR BLD AUTO: 0.4 % (ref 0–0.9)
LYMPHOCYTES # BLD AUTO: 1.65 X10*3/UL (ref 0.8–3)
LYMPHOCYTES NFR BLD AUTO: 32.2 %
MAGNESIUM SERPL-MCNC: 2.14 MG/DL (ref 1.6–2.4)
MCH RBC QN AUTO: 29.6 PG (ref 26–34)
MCHC RBC AUTO-ENTMCNC: 33.3 G/DL (ref 32–36)
MCV RBC AUTO: 89 FL (ref 80–100)
MONOCYTES # BLD AUTO: 0.41 X10*3/UL (ref 0.05–0.8)
MONOCYTES NFR BLD AUTO: 8 %
NEUTROPHILS # BLD AUTO: 2.95 X10*3/UL (ref 1.6–5.5)
NEUTROPHILS NFR BLD AUTO: 57.4 %
NRBC BLD-RTO: 0 /100 WBCS (ref 0–0)
PLATELET # BLD AUTO: 214 X10*3/UL (ref 150–450)
POTASSIUM SERPL-SCNC: 4.2 MMOL/L (ref 3.5–5.3)
PROT SERPL-MCNC: 7.9 G/DL (ref 6.4–8.2)
RBC # BLD AUTO: 4.67 X10*6/UL (ref 4–5.2)
SODIUM SERPL-SCNC: 136 MMOL/L (ref 136–145)
WBC # BLD AUTO: 5.1 X10*3/UL (ref 4.4–11.3)

## 2025-09-06 PROCEDURE — 82248 BILIRUBIN DIRECT: CPT | Performed by: EMERGENCY MEDICINE

## 2025-09-06 PROCEDURE — 84484 ASSAY OF TROPONIN QUANT: CPT | Performed by: EMERGENCY MEDICINE

## 2025-09-06 PROCEDURE — 99222 1ST HOSP IP/OBS MODERATE 55: CPT

## 2025-09-06 PROCEDURE — 2500000002 HC RX 250 W HCPCS SELF ADMINISTERED DRUGS (ALT 637 FOR MEDICARE OP, ALT 636 FOR OP/ED)

## 2025-09-06 PROCEDURE — 85025 COMPLETE CBC W/AUTO DIFF WBC: CPT | Performed by: EMERGENCY MEDICINE

## 2025-09-06 PROCEDURE — G0378 HOSPITAL OBSERVATION PER HR: HCPCS

## 2025-09-06 PROCEDURE — 36415 COLL VENOUS BLD VENIPUNCTURE: CPT | Performed by: EMERGENCY MEDICINE

## 2025-09-06 PROCEDURE — 2500000001 HC RX 250 WO HCPCS SELF ADMINISTERED DRUGS (ALT 637 FOR MEDICARE OP)

## 2025-09-06 PROCEDURE — 2500000004 HC RX 250 GENERAL PHARMACY W/ HCPCS (ALT 636 FOR OP/ED): Performed by: EMERGENCY MEDICINE

## 2025-09-06 PROCEDURE — 83735 ASSAY OF MAGNESIUM: CPT | Performed by: EMERGENCY MEDICINE

## 2025-09-06 PROCEDURE — 80053 COMPREHEN METABOLIC PANEL: CPT | Performed by: EMERGENCY MEDICINE

## 2025-09-06 PROCEDURE — 83880 ASSAY OF NATRIURETIC PEPTIDE: CPT | Performed by: EMERGENCY MEDICINE

## 2025-09-06 PROCEDURE — 99291 CRITICAL CARE FIRST HOUR: CPT | Mod: 25 | Performed by: EMERGENCY MEDICINE

## 2025-09-06 RX ORDER — SERTRALINE HYDROCHLORIDE 50 MG/1
75 TABLET, FILM COATED ORAL DAILY
Status: DISPENSED | OUTPATIENT
Start: 2025-09-06

## 2025-09-06 RX ORDER — AMLODIPINE BESYLATE 5 MG/1
5 TABLET ORAL DAILY
Status: DISPENSED | OUTPATIENT
Start: 2025-09-06

## 2025-09-06 RX ORDER — ATENOLOL 50 MG/1
50 TABLET ORAL DAILY
Status: DISCONTINUED | OUTPATIENT
Start: 2025-09-06 | End: 2025-09-07

## 2025-09-06 RX ORDER — DILTIAZEM HYDROCHLORIDE 5 MG/ML
10 INJECTION INTRAVENOUS ONCE
Status: COMPLETED | OUTPATIENT
Start: 2025-09-06 | End: 2025-09-06

## 2025-09-06 RX ORDER — ZOLPIDEM TARTRATE 5 MG/1
5 TABLET ORAL NIGHTLY
Status: DISPENSED | OUTPATIENT
Start: 2025-09-06

## 2025-09-06 RX ORDER — POLYETHYLENE GLYCOL 3350 17 G/17G
17 POWDER, FOR SOLUTION ORAL DAILY PRN
Status: ACTIVE | OUTPATIENT
Start: 2025-09-06

## 2025-09-06 RX ORDER — METOPROLOL TARTRATE 1 MG/ML
5 INJECTION, SOLUTION INTRAVENOUS EVERY 6 HOURS PRN
Status: DISPENSED | OUTPATIENT
Start: 2025-09-06

## 2025-09-06 RX ORDER — CHOLECALCIFEROL (VITAMIN D3) 25 MCG
50 TABLET ORAL DAILY
Status: DISPENSED | OUTPATIENT
Start: 2025-09-07

## 2025-09-06 RX ORDER — ACETAMINOPHEN 325 MG/1
650 TABLET ORAL EVERY 4 HOURS PRN
Status: ACTIVE | OUTPATIENT
Start: 2025-09-06

## 2025-09-06 RX ORDER — ATORVASTATIN CALCIUM 20 MG/1
20 TABLET, FILM COATED ORAL NIGHTLY
Status: DISPENSED | OUTPATIENT
Start: 2025-09-06

## 2025-09-06 RX ADMIN — DILTIAZEM HYDROCHLORIDE 10 MG: 5 INJECTION, SOLUTION INTRAVENOUS at 15:42

## 2025-09-06 RX ADMIN — ATORVASTATIN CALCIUM 20 MG: 20 TABLET, FILM COATED ORAL at 21:20

## 2025-09-06 RX ADMIN — DILTIAZEM HYDROCHLORIDE 10 MG: 5 INJECTION, SOLUTION INTRAVENOUS at 18:02

## 2025-09-06 RX ADMIN — AMLODIPINE BESYLATE 5 MG: 5 TABLET ORAL at 19:38

## 2025-09-06 RX ADMIN — APIXABAN 5 MG: 5 TABLET, FILM COATED ORAL at 21:20

## 2025-09-06 RX ADMIN — ZOLPIDEM TARTRATE 5 MG: 5 TABLET, FILM COATED ORAL at 21:20

## 2025-09-06 RX ADMIN — SERTRALINE 75 MG: 50 TABLET, FILM COATED ORAL at 19:38

## 2025-09-06 SDOH — ECONOMIC STABILITY: FOOD INSECURITY: WITHIN THE PAST 12 MONTHS, THE FOOD YOU BOUGHT JUST DIDN'T LAST AND YOU DIDN'T HAVE MONEY TO GET MORE.: NEVER TRUE

## 2025-09-06 SDOH — SOCIAL STABILITY: SOCIAL INSECURITY: DOES ANYONE TRY TO KEEP YOU FROM HAVING/CONTACTING OTHER FRIENDS OR DOING THINGS OUTSIDE YOUR HOME?: NO

## 2025-09-06 SDOH — SOCIAL STABILITY: SOCIAL INSECURITY: HAS ANYONE EVER THREATENED TO HURT YOUR FAMILY OR YOUR PETS?: NO

## 2025-09-06 SDOH — SOCIAL STABILITY: SOCIAL INSECURITY
WITHIN THE LAST YEAR, HAVE YOU BEEN KICKED, HIT, SLAPPED, OR OTHERWISE PHYSICALLY HURT BY YOUR PARTNER OR EX-PARTNER?: NO

## 2025-09-06 SDOH — SOCIAL STABILITY: SOCIAL INSECURITY: WITHIN THE LAST YEAR, HAVE YOU BEEN HUMILIATED OR EMOTIONALLY ABUSED IN OTHER WAYS BY YOUR PARTNER OR EX-PARTNER?: NO

## 2025-09-06 SDOH — SOCIAL STABILITY: SOCIAL INSECURITY: WITHIN THE LAST YEAR, HAVE YOU BEEN AFRAID OF YOUR PARTNER OR EX-PARTNER?: NO

## 2025-09-06 SDOH — SOCIAL STABILITY: SOCIAL INSECURITY: HAVE YOU HAD ANY THOUGHTS OF HARMING ANYONE ELSE?: NO

## 2025-09-06 SDOH — SOCIAL STABILITY: SOCIAL INSECURITY: WERE YOU ABLE TO COMPLETE ALL THE BEHAVIORAL HEALTH SCREENINGS?: YES

## 2025-09-06 SDOH — SOCIAL STABILITY: SOCIAL INSECURITY
WITHIN THE LAST YEAR, HAVE YOU BEEN RAPED OR FORCED TO HAVE ANY KIND OF SEXUAL ACTIVITY BY YOUR PARTNER OR EX-PARTNER?: NO

## 2025-09-06 SDOH — ECONOMIC STABILITY: INCOME INSECURITY: IN THE PAST 12 MONTHS HAS THE ELECTRIC, GAS, OIL, OR WATER COMPANY THREATENED TO SHUT OFF SERVICES IN YOUR HOME?: NO

## 2025-09-06 SDOH — SOCIAL STABILITY: SOCIAL INSECURITY: DO YOU FEEL ANYONE HAS EXPLOITED OR TAKEN ADVANTAGE OF YOU FINANCIALLY OR OF YOUR PERSONAL PROPERTY?: NO

## 2025-09-06 SDOH — SOCIAL STABILITY: SOCIAL INSECURITY: ABUSE: ADULT

## 2025-09-06 SDOH — ECONOMIC STABILITY: FOOD INSECURITY: WITHIN THE PAST 12 MONTHS, YOU WORRIED THAT YOUR FOOD WOULD RUN OUT BEFORE YOU GOT THE MONEY TO BUY MORE.: NEVER TRUE

## 2025-09-06 SDOH — SOCIAL STABILITY: SOCIAL INSECURITY: HAVE YOU HAD THOUGHTS OF HARMING ANYONE ELSE?: NO

## 2025-09-06 SDOH — SOCIAL STABILITY: SOCIAL INSECURITY: DO YOU FEEL UNSAFE GOING BACK TO THE PLACE WHERE YOU ARE LIVING?: NO

## 2025-09-06 SDOH — SOCIAL STABILITY: SOCIAL INSECURITY: ARE YOU OR HAVE YOU BEEN THREATENED OR ABUSED PHYSICALLY, EMOTIONALLY, OR SEXUALLY BY ANYONE?: NO

## 2025-09-06 SDOH — SOCIAL STABILITY: SOCIAL INSECURITY: ARE THERE ANY APPARENT SIGNS OF INJURIES/BEHAVIORS THAT COULD BE RELATED TO ABUSE/NEGLECT?: NO

## 2025-09-06 ASSESSMENT — LIFESTYLE VARIABLES
AUDIT-C TOTAL SCORE: 4
HOW OFTEN DO YOU HAVE 6 OR MORE DRINKS ON ONE OCCASION: NEVER
AUDIT-C TOTAL SCORE: 4
SKIP TO QUESTIONS 9-10: 1
HOW MANY STANDARD DRINKS CONTAINING ALCOHOL DO YOU HAVE ON A TYPICAL DAY: 1 OR 2
HOW OFTEN DO YOU HAVE A DRINK CONTAINING ALCOHOL: 4 OR MORE TIMES A WEEK

## 2025-09-06 ASSESSMENT — PAIN SCALES - GENERAL
PAINLEVEL_OUTOF10: 0 - NO PAIN

## 2025-09-06 ASSESSMENT — COGNITIVE AND FUNCTIONAL STATUS - GENERAL
MOBILITY SCORE: 24
PATIENT BASELINE BEDBOUND: NO
DAILY ACTIVITIY SCORE: 24

## 2025-09-06 ASSESSMENT — ACTIVITIES OF DAILY LIVING (ADL)
ADEQUATE_TO_COMPLETE_ADL: YES
HEARING - LEFT EAR: FUNCTIONAL
LACK_OF_TRANSPORTATION: NO
DRESSING YOURSELF: INDEPENDENT
BATHING: INDEPENDENT
GROOMING: INDEPENDENT
HEARING - RIGHT EAR: FUNCTIONAL
JUDGMENT_ADEQUATE_SAFELY_COMPLETE_DAILY_ACTIVITIES: YES
TOILETING: INDEPENDENT
WALKS IN HOME: INDEPENDENT
PATIENT'S MEMORY ADEQUATE TO SAFELY COMPLETE DAILY ACTIVITIES?: YES
FEEDING YOURSELF: INDEPENDENT

## 2025-09-06 ASSESSMENT — PAIN - FUNCTIONAL ASSESSMENT: PAIN_FUNCTIONAL_ASSESSMENT: 0-10

## 2025-09-06 ASSESSMENT — PATIENT HEALTH QUESTIONNAIRE - PHQ9
1. LITTLE INTEREST OR PLEASURE IN DOING THINGS: NOT AT ALL
2. FEELING DOWN, DEPRESSED OR HOPELESS: NOT AT ALL
SUM OF ALL RESPONSES TO PHQ9 QUESTIONS 1 & 2: 0

## 2025-09-07 VITALS
WEIGHT: 155 LBS | HEART RATE: 65 BPM | BODY MASS INDEX: 27.46 KG/M2 | OXYGEN SATURATION: 94 % | HEIGHT: 63 IN | DIASTOLIC BLOOD PRESSURE: 55 MMHG | RESPIRATION RATE: 19 BRPM | SYSTOLIC BLOOD PRESSURE: 130 MMHG | TEMPERATURE: 99.1 F

## 2025-09-07 LAB — TSH SERPL-ACNC: 1.83 MIU/L (ref 0.44–3.98)

## 2025-09-07 PROCEDURE — 84443 ASSAY THYROID STIM HORMONE: CPT

## 2025-09-07 PROCEDURE — 2500000004 HC RX 250 GENERAL PHARMACY W/ HCPCS (ALT 636 FOR OP/ED)

## 2025-09-07 PROCEDURE — 2500000001 HC RX 250 WO HCPCS SELF ADMINISTERED DRUGS (ALT 637 FOR MEDICARE OP)

## 2025-09-07 PROCEDURE — 2500000002 HC RX 250 W HCPCS SELF ADMINISTERED DRUGS (ALT 637 FOR MEDICARE OP, ALT 636 FOR OP/ED)

## 2025-09-07 PROCEDURE — 1200000002 HC GENERAL ROOM WITH TELEMETRY DAILY

## 2025-09-07 PROCEDURE — 36415 COLL VENOUS BLD VENIPUNCTURE: CPT

## 2025-09-07 RX ORDER — ATENOLOL 50 MG/1
100 TABLET ORAL DAILY
Status: DISCONTINUED | OUTPATIENT
Start: 2025-09-08 | End: 2025-09-07

## 2025-09-07 RX ORDER — ATENOLOL 50 MG/1
100 TABLET ORAL DAILY
Status: DISPENSED | OUTPATIENT
Start: 2025-09-07

## 2025-09-07 RX ADMIN — SERTRALINE 75 MG: 50 TABLET, FILM COATED ORAL at 08:37

## 2025-09-07 RX ADMIN — AMLODIPINE BESYLATE 5 MG: 5 TABLET ORAL at 08:37

## 2025-09-07 RX ADMIN — ATORVASTATIN CALCIUM 20 MG: 20 TABLET, FILM COATED ORAL at 20:41

## 2025-09-07 RX ADMIN — ATENOLOL 100 MG: 50 TABLET ORAL at 12:27

## 2025-09-07 RX ADMIN — METOPROLOL TARTRATE 5 MG: 1 INJECTION, SOLUTION INTRAVENOUS at 08:26

## 2025-09-07 RX ADMIN — Medication 50 MCG: at 06:03

## 2025-09-07 RX ADMIN — ZOLPIDEM TARTRATE 5 MG: 5 TABLET, FILM COATED ORAL at 20:41

## 2025-09-07 RX ADMIN — APIXABAN 5 MG: 5 TABLET, FILM COATED ORAL at 20:41

## 2025-09-07 RX ADMIN — APIXABAN 5 MG: 5 TABLET, FILM COATED ORAL at 08:37

## 2025-09-07 ASSESSMENT — COGNITIVE AND FUNCTIONAL STATUS - GENERAL
MOBILITY SCORE: 24
MOBILITY SCORE: 23
CLIMB 3 TO 5 STEPS WITH RAILING: A LITTLE
DAILY ACTIVITIY SCORE: 24
DAILY ACTIVITIY SCORE: 24

## 2025-09-07 ASSESSMENT — PAIN - FUNCTIONAL ASSESSMENT
PAIN_FUNCTIONAL_ASSESSMENT: 0-10

## 2025-09-07 ASSESSMENT — PAIN SCALES - GENERAL
PAINLEVEL_OUTOF10: 0 - NO PAIN

## 2025-09-07 ASSESSMENT — ENCOUNTER SYMPTOMS: IRREGULAR HEARTBEAT: 1

## 2026-02-27 ENCOUNTER — APPOINTMENT (OUTPATIENT)
Dept: PRIMARY CARE | Facility: CLINIC | Age: 86
End: 2026-02-27
Payer: MEDICARE